# Patient Record
Sex: FEMALE | Race: WHITE | Employment: UNEMPLOYED | URBAN - METROPOLITAN AREA
[De-identification: names, ages, dates, MRNs, and addresses within clinical notes are randomized per-mention and may not be internally consistent; named-entity substitution may affect disease eponyms.]

---

## 2024-08-22 ENCOUNTER — APPOINTMENT (OUTPATIENT)
Dept: CARDIOLOGY | Facility: HOSPITAL | Age: 54
End: 2024-08-22
Payer: MEDICAID

## 2024-08-22 ENCOUNTER — HOSPITAL ENCOUNTER (EMERGENCY)
Facility: HOSPITAL | Age: 54
Discharge: OTHER INSTITUTION | End: 2024-08-23
Attending: EMERGENCY MEDICINE
Payer: MEDICAID

## 2024-08-22 DIAGNOSIS — K91.89 OTHER POSTOPERATIVE COMPLICATION INVOLVING DIGESTIVE SYSTEM: Primary | ICD-10-CM

## 2024-08-22 LAB
ALBUMIN SERPL BCP-MCNC: 3.3 G/DL (ref 3.4–5)
ALP SERPL-CCNC: 60 U/L (ref 33–110)
ALT SERPL W P-5'-P-CCNC: 77 U/L (ref 7–45)
ANION GAP SERPL CALC-SCNC: 12 MMOL/L (ref 10–20)
AST SERPL W P-5'-P-CCNC: 56 U/L (ref 9–39)
BASOPHILS # BLD MANUAL: 0 X10*3/UL (ref 0–0.1)
BASOPHILS NFR BLD MANUAL: 0 %
BILIRUB SERPL-MCNC: 0.3 MG/DL (ref 0–1.2)
BUN SERPL-MCNC: 15 MG/DL (ref 6–23)
CALCIUM SERPL-MCNC: 8.5 MG/DL (ref 8.6–10.3)
CHLORIDE SERPL-SCNC: 96 MMOL/L (ref 98–107)
CO2 SERPL-SCNC: 28 MMOL/L (ref 21–32)
CREAT SERPL-MCNC: 0.83 MG/DL (ref 0.5–1.05)
EGFRCR SERPLBLD CKD-EPI 2021: 84 ML/MIN/1.73M*2
EOSINOPHIL # BLD MANUAL: 0.19 X10*3/UL (ref 0–0.7)
EOSINOPHIL NFR BLD MANUAL: 1 %
ERYTHROCYTE [DISTWIDTH] IN BLOOD BY AUTOMATED COUNT: 14.7 % (ref 11.5–14.5)
GLUCOSE SERPL-MCNC: 110 MG/DL (ref 74–99)
HCT VFR BLD AUTO: 23 % (ref 36–46)
HGB BLD-MCNC: 7.8 G/DL (ref 12–16)
HYPOCHROMIA BLD QL SMEAR: ABNORMAL
IMM GRANULOCYTES # BLD AUTO: 1.01 X10*3/UL (ref 0–0.7)
IMM GRANULOCYTES NFR BLD AUTO: 5.3 % (ref 0–0.9)
LYMPHOCYTES # BLD MANUAL: 1.52 X10*3/UL (ref 1.2–4.8)
LYMPHOCYTES NFR BLD MANUAL: 8 %
MCH RBC QN AUTO: 30 PG (ref 26–34)
MCHC RBC AUTO-ENTMCNC: 33.9 G/DL (ref 32–36)
MCV RBC AUTO: 89 FL (ref 80–100)
MONOCYTES # BLD MANUAL: 0.19 X10*3/UL (ref 0.1–1)
MONOCYTES NFR BLD MANUAL: 1 %
NEUTROPHILS # BLD MANUAL: 17.1 X10*3/UL (ref 1.2–7.7)
NEUTS BAND # BLD MANUAL: 1.52 X10*3/UL (ref 0–0.7)
NEUTS BAND NFR BLD MANUAL: 8 %
NEUTS SEG # BLD MANUAL: 15.58 X10*3/UL (ref 1.2–7)
NEUTS SEG NFR BLD MANUAL: 82 %
NRBC BLD-RTO: 2.3 /100 WBCS (ref 0–0)
PLATELET # BLD AUTO: 523 X10*3/UL (ref 150–450)
POLYCHROMASIA BLD QL SMEAR: ABNORMAL
POTASSIUM SERPL-SCNC: 3.2 MMOL/L (ref 3.5–5.3)
PROT SERPL-MCNC: 6.4 G/DL (ref 6.4–8.2)
RBC # BLD AUTO: 2.6 X10*6/UL (ref 4–5.2)
RBC MORPH BLD: ABNORMAL
SODIUM SERPL-SCNC: 133 MMOL/L (ref 136–145)
TOTAL CELLS COUNTED BLD: 100
WBC # BLD AUTO: 19 X10*3/UL (ref 4.4–11.3)

## 2024-08-22 PROCEDURE — 96375 TX/PRO/DX INJ NEW DRUG ADDON: CPT

## 2024-08-22 PROCEDURE — 85007 BL SMEAR W/DIFF WBC COUNT: CPT | Performed by: EMERGENCY MEDICINE

## 2024-08-22 PROCEDURE — 93005 ELECTROCARDIOGRAM TRACING: CPT

## 2024-08-22 PROCEDURE — 99285 EMERGENCY DEPT VISIT HI MDM: CPT | Mod: 25

## 2024-08-22 PROCEDURE — 80053 COMPREHEN METABOLIC PANEL: CPT | Performed by: EMERGENCY MEDICINE

## 2024-08-22 PROCEDURE — 36415 COLL VENOUS BLD VENIPUNCTURE: CPT | Performed by: EMERGENCY MEDICINE

## 2024-08-22 PROCEDURE — 85027 COMPLETE CBC AUTOMATED: CPT | Performed by: EMERGENCY MEDICINE

## 2024-08-22 PROCEDURE — 96361 HYDRATE IV INFUSION ADD-ON: CPT

## 2024-08-22 PROCEDURE — 2500000004 HC RX 250 GENERAL PHARMACY W/ HCPCS (ALT 636 FOR OP/ED): Performed by: EMERGENCY MEDICINE

## 2024-08-22 RX ORDER — MORPHINE SULFATE 4 MG/ML
4 INJECTION INTRAVENOUS ONCE
Status: COMPLETED | OUTPATIENT
Start: 2024-08-22 | End: 2024-08-22

## 2024-08-22 RX ORDER — ONDANSETRON HYDROCHLORIDE 2 MG/ML
4 INJECTION, SOLUTION INTRAVENOUS ONCE
Status: COMPLETED | OUTPATIENT
Start: 2024-08-22 | End: 2024-08-22

## 2024-08-22 RX ADMIN — ONDANSETRON 4 MG: 2 INJECTION INTRAMUSCULAR; INTRAVENOUS at 23:13

## 2024-08-22 RX ADMIN — MORPHINE SULFATE 4 MG: 4 INJECTION INTRAVENOUS at 23:12

## 2024-08-22 RX ADMIN — SODIUM CHLORIDE 1000 ML: 9 INJECTION, SOLUTION INTRAVENOUS at 23:12

## 2024-08-22 ASSESSMENT — PAIN DESCRIPTION - LOCATION
LOCATION: ABDOMEN
LOCATION: ABDOMEN

## 2024-08-22 ASSESSMENT — PAIN - FUNCTIONAL ASSESSMENT: PAIN_FUNCTIONAL_ASSESSMENT: 0-10

## 2024-08-22 ASSESSMENT — LIFESTYLE VARIABLES
HAVE PEOPLE ANNOYED YOU BY CRITICIZING YOUR DRINKING: NO
EVER HAD A DRINK FIRST THING IN THE MORNING TO STEADY YOUR NERVES TO GET RID OF A HANGOVER: NO
HAVE YOU EVER FELT YOU SHOULD CUT DOWN ON YOUR DRINKING: NO
TOTAL SCORE: 0
EVER FELT BAD OR GUILTY ABOUT YOUR DRINKING: NO

## 2024-08-22 ASSESSMENT — PAIN SCALES - GENERAL
PAINLEVEL_OUTOF10: 6
PAINLEVEL_OUTOF10: 7

## 2024-08-22 ASSESSMENT — COLUMBIA-SUICIDE SEVERITY RATING SCALE - C-SSRS
1. IN THE PAST MONTH, HAVE YOU WISHED YOU WERE DEAD OR WISHED YOU COULD GO TO SLEEP AND NOT WAKE UP?: NO
6. HAVE YOU EVER DONE ANYTHING, STARTED TO DO ANYTHING, OR PREPARED TO DO ANYTHING TO END YOUR LIFE?: NO
2. HAVE YOU ACTUALLY HAD ANY THOUGHTS OF KILLING YOURSELF?: NO

## 2024-08-22 ASSESSMENT — PAIN DESCRIPTION - PAIN TYPE: TYPE: ACUTE PAIN

## 2024-08-23 ENCOUNTER — APPOINTMENT (OUTPATIENT)
Dept: RADIOLOGY | Facility: HOSPITAL | Age: 54
End: 2024-08-23
Payer: MEDICAID

## 2024-08-23 VITALS
TEMPERATURE: 98.3 F | WEIGHT: 195.11 LBS | DIASTOLIC BLOOD PRESSURE: 84 MMHG | RESPIRATION RATE: 9 BRPM | HEART RATE: 79 BPM | BODY MASS INDEX: 30.62 KG/M2 | SYSTOLIC BLOOD PRESSURE: 133 MMHG | OXYGEN SATURATION: 100 % | HEIGHT: 67 IN

## 2024-08-23 LAB
APPEARANCE UR: CLEAR
ATRIAL RATE: 97 BPM
BACTERIA #/AREA URNS AUTO: ABNORMAL /HPF
BILIRUB UR STRIP.AUTO-MCNC: NEGATIVE MG/DL
COLOR UR: ABNORMAL
GLUCOSE UR STRIP.AUTO-MCNC: NORMAL MG/DL
HOLD SPECIMEN: NORMAL
KETONES UR STRIP.AUTO-MCNC: NEGATIVE MG/DL
LACTATE SERPL-SCNC: 0.6 MMOL/L (ref 0.4–2)
LEUKOCYTE ESTERASE UR QL STRIP.AUTO: ABNORMAL
NITRITE UR QL STRIP.AUTO: NEGATIVE
P AXIS: 56 DEGREES
PH UR STRIP.AUTO: 6 [PH]
PR INTERVAL: 128 MS
PROT UR STRIP.AUTO-MCNC: NEGATIVE MG/DL
Q ONSET: 249 MS
QRS COUNT: 16 BEATS
QRS DURATION: 96 MS
QT INTERVAL: 376 MS
QTC CALCULATION(BAZETT): 478 MS
QTC FREDERICIA: 441 MS
R AXIS: 36 DEGREES
RBC # UR STRIP.AUTO: NEGATIVE /UL
RBC #/AREA URNS AUTO: ABNORMAL /HPF
SP GR UR STRIP.AUTO: 1.03
SQUAMOUS #/AREA URNS AUTO: ABNORMAL /HPF
T AXIS: 59 DEGREES
T OFFSET: 437 MS
UROBILINOGEN UR STRIP.AUTO-MCNC: NORMAL MG/DL
VENTRICULAR RATE: 97 BPM
WBC #/AREA URNS AUTO: ABNORMAL /HPF

## 2024-08-23 PROCEDURE — 87040 BLOOD CULTURE FOR BACTERIA: CPT | Mod: PORLAB | Performed by: EMERGENCY MEDICINE

## 2024-08-23 PROCEDURE — 74177 CT ABD & PELVIS W/CONTRAST: CPT | Performed by: STUDENT IN AN ORGANIZED HEALTH CARE EDUCATION/TRAINING PROGRAM

## 2024-08-23 PROCEDURE — 96365 THER/PROPH/DIAG IV INF INIT: CPT

## 2024-08-23 PROCEDURE — 2500000001 HC RX 250 WO HCPCS SELF ADMINISTERED DRUGS (ALT 637 FOR MEDICARE OP): Performed by: STUDENT IN AN ORGANIZED HEALTH CARE EDUCATION/TRAINING PROGRAM

## 2024-08-23 PROCEDURE — 81001 URINALYSIS AUTO W/SCOPE: CPT | Performed by: EMERGENCY MEDICINE

## 2024-08-23 PROCEDURE — 83605 ASSAY OF LACTIC ACID: CPT | Performed by: EMERGENCY MEDICINE

## 2024-08-23 PROCEDURE — 2500000004 HC RX 250 GENERAL PHARMACY W/ HCPCS (ALT 636 FOR OP/ED): Mod: JZ | Performed by: STUDENT IN AN ORGANIZED HEALTH CARE EDUCATION/TRAINING PROGRAM

## 2024-08-23 PROCEDURE — 96366 THER/PROPH/DIAG IV INF ADDON: CPT

## 2024-08-23 PROCEDURE — 36415 COLL VENOUS BLD VENIPUNCTURE: CPT | Performed by: EMERGENCY MEDICINE

## 2024-08-23 PROCEDURE — 2500000004 HC RX 250 GENERAL PHARMACY W/ HCPCS (ALT 636 FOR OP/ED): Performed by: EMERGENCY MEDICINE

## 2024-08-23 PROCEDURE — 2500000004 HC RX 250 GENERAL PHARMACY W/ HCPCS (ALT 636 FOR OP/ED)

## 2024-08-23 PROCEDURE — 96375 TX/PRO/DX INJ NEW DRUG ADDON: CPT

## 2024-08-23 PROCEDURE — 2500000004 HC RX 250 GENERAL PHARMACY W/ HCPCS (ALT 636 FOR OP/ED): Performed by: STUDENT IN AN ORGANIZED HEALTH CARE EDUCATION/TRAINING PROGRAM

## 2024-08-23 PROCEDURE — 74177 CT ABD & PELVIS W/CONTRAST: CPT

## 2024-08-23 PROCEDURE — 2550000001 HC RX 255 CONTRASTS: Performed by: EMERGENCY MEDICINE

## 2024-08-23 PROCEDURE — 87086 URINE CULTURE/COLONY COUNT: CPT | Mod: PORLAB | Performed by: EMERGENCY MEDICINE

## 2024-08-23 PROCEDURE — 96367 TX/PROPH/DG ADDL SEQ IV INF: CPT

## 2024-08-23 PROCEDURE — 96376 TX/PRO/DX INJ SAME DRUG ADON: CPT

## 2024-08-23 RX ORDER — METRONIDAZOLE 500 MG/100ML
500 INJECTION, SOLUTION INTRAVENOUS EVERY 8 HOURS
Status: DISCONTINUED | OUTPATIENT
Start: 2024-08-23 | End: 2024-08-24 | Stop reason: HOSPADM

## 2024-08-23 RX ORDER — ONDANSETRON HYDROCHLORIDE 2 MG/ML
INJECTION, SOLUTION INTRAVENOUS
Status: COMPLETED
Start: 2024-08-23 | End: 2024-08-23

## 2024-08-23 RX ORDER — OXYCODONE HYDROCHLORIDE 5 MG/1
5 TABLET ORAL EVERY 6 HOURS PRN
Status: DISCONTINUED | OUTPATIENT
Start: 2024-08-23 | End: 2024-08-24 | Stop reason: HOSPADM

## 2024-08-23 RX ORDER — FENTANYL CITRATE 50 UG/ML
50 INJECTION, SOLUTION INTRAMUSCULAR; INTRAVENOUS ONCE
Status: COMPLETED | OUTPATIENT
Start: 2024-08-23 | End: 2024-08-23

## 2024-08-23 RX ORDER — ACETAMINOPHEN 325 MG/1
975 TABLET ORAL EVERY 8 HOURS
Status: DISCONTINUED | OUTPATIENT
Start: 2024-08-23 | End: 2024-08-24 | Stop reason: HOSPADM

## 2024-08-23 RX ORDER — ONDANSETRON HYDROCHLORIDE 2 MG/ML
4 INJECTION, SOLUTION INTRAVENOUS ONCE
Status: COMPLETED | OUTPATIENT
Start: 2024-08-23 | End: 2024-08-23

## 2024-08-23 RX ORDER — CEFEPIME 1 G/50ML
2 INJECTION, SOLUTION INTRAVENOUS ONCE
Status: COMPLETED | OUTPATIENT
Start: 2024-08-23 | End: 2024-08-23

## 2024-08-23 RX ORDER — MORPHINE SULFATE 4 MG/ML
INJECTION INTRAVENOUS
Status: DISCONTINUED
Start: 2024-08-23 | End: 2024-08-23 | Stop reason: WASHOUT

## 2024-08-23 RX ORDER — CEFEPIME 1 G/50ML
1 INJECTION, SOLUTION INTRAVENOUS EVERY 12 HOURS
Status: DISCONTINUED | OUTPATIENT
Start: 2024-08-23 | End: 2024-08-24 | Stop reason: HOSPADM

## 2024-08-23 RX ORDER — METRONIDAZOLE 500 MG/100ML
500 INJECTION, SOLUTION INTRAVENOUS ONCE
Status: COMPLETED | OUTPATIENT
Start: 2024-08-23 | End: 2024-08-23

## 2024-08-23 RX ORDER — MORPHINE SULFATE 4 MG/ML
4 INJECTION INTRAVENOUS ONCE
Status: COMPLETED | OUTPATIENT
Start: 2024-08-23 | End: 2024-08-23

## 2024-08-23 RX ORDER — DICYCLOMINE HYDROCHLORIDE 10 MG/1
10 CAPSULE ORAL ONCE
Status: COMPLETED | OUTPATIENT
Start: 2024-08-23 | End: 2024-08-23

## 2024-08-23 RX ORDER — KETOROLAC TROMETHAMINE 30 MG/ML
15 INJECTION, SOLUTION INTRAMUSCULAR; INTRAVENOUS EVERY 6 HOURS PRN
Status: DISCONTINUED | OUTPATIENT
Start: 2024-08-23 | End: 2024-08-24 | Stop reason: HOSPADM

## 2024-08-23 RX ADMIN — MORPHINE SULFATE 4 MG: 4 INJECTION INTRAVENOUS at 01:47

## 2024-08-23 RX ADMIN — FENTANYL CITRATE 50 MCG: 50 INJECTION INTRAMUSCULAR; INTRAVENOUS at 20:20

## 2024-08-23 RX ADMIN — ACETAMINOPHEN 975 MG: 325 TABLET ORAL at 12:05

## 2024-08-23 RX ADMIN — ONDANSETRON 4 MG: 2 INJECTION INTRAMUSCULAR; INTRAVENOUS at 07:49

## 2024-08-23 RX ADMIN — CEFEPIME 1 G: 1 INJECTION, SOLUTION INTRAVENOUS at 14:41

## 2024-08-23 RX ADMIN — METRONIDAZOLE 500 MG: 500 INJECTION, SOLUTION INTRAVENOUS at 20:25

## 2024-08-23 RX ADMIN — FENTANYL CITRATE 50 MCG: 50 INJECTION INTRAMUSCULAR; INTRAVENOUS at 22:52

## 2024-08-23 RX ADMIN — METRONIDAZOLE 500 MG: 500 INJECTION, SOLUTION INTRAVENOUS at 02:24

## 2024-08-23 RX ADMIN — CEFEPIME 2 G: 1 INJECTION, SOLUTION INTRAVENOUS at 01:14

## 2024-08-23 RX ADMIN — ONDANSETRON 4 MG: 2 INJECTION INTRAMUSCULAR; INTRAVENOUS at 13:49

## 2024-08-23 RX ADMIN — DICYCLOMINE HYDROCHLORIDE 10 MG: 10 CAPSULE ORAL at 20:21

## 2024-08-23 RX ADMIN — ONDANSETRON HYDROCHLORIDE 4 MG: 2 INJECTION, SOLUTION INTRAVENOUS at 18:52

## 2024-08-23 RX ADMIN — ONDANSETRON 4 MG: 2 INJECTION INTRAMUSCULAR; INTRAVENOUS at 18:52

## 2024-08-23 RX ADMIN — SODIUM CHLORIDE 2655 ML: 9 INJECTION, SOLUTION INTRAVENOUS at 01:13

## 2024-08-23 RX ADMIN — IOHEXOL 75 ML: 350 INJECTION, SOLUTION INTRAVENOUS at 00:48

## 2024-08-23 RX ADMIN — METRONIDAZOLE 500 MG: 500 INJECTION, SOLUTION INTRAVENOUS at 11:41

## 2024-08-23 RX ADMIN — OXYCODONE HYDROCHLORIDE 5 MG: 5 TABLET ORAL at 11:50

## 2024-08-23 RX ADMIN — KETOROLAC TROMETHAMINE 15 MG: 30 INJECTION, SOLUTION INTRAMUSCULAR at 12:06

## 2024-08-23 RX ADMIN — ACETAMINOPHEN 975 MG: 325 TABLET ORAL at 22:54

## 2024-08-23 ASSESSMENT — PAIN DESCRIPTION - PAIN TYPE: TYPE: ACUTE PAIN

## 2024-08-23 ASSESSMENT — PAIN SCALES - GENERAL
PAINLEVEL_OUTOF10: 3
PAINLEVEL_OUTOF10: 8
PAINLEVEL_OUTOF10: 5 - MODERATE PAIN
PAINLEVEL_OUTOF10: 7
PAINLEVEL_OUTOF10: 7
PAINLEVEL_OUTOF10: 6
PAINLEVEL_OUTOF10: 8
PAINLEVEL_OUTOF10: 8
PAINLEVEL_OUTOF10: 7
PAINLEVEL_OUTOF10: 8
PAINLEVEL_OUTOF10: 7
PAINLEVEL_OUTOF10: 8

## 2024-08-23 ASSESSMENT — PAIN SCALES - WONG BAKER
WONGBAKER_NUMERICALRESPONSE: HURTS LITTLE MORE
WONGBAKER_NUMERICALRESPONSE: HURTS LITTLE MORE

## 2024-08-23 ASSESSMENT — PAIN DESCRIPTION - LOCATION
LOCATION: ABDOMEN
LOCATION: ABDOMEN

## 2024-08-23 ASSESSMENT — PAIN - FUNCTIONAL ASSESSMENT: PAIN_FUNCTIONAL_ASSESSMENT: 0-10

## 2024-08-23 ASSESSMENT — PAIN DESCRIPTION - PROGRESSION: CLINICAL_PROGRESSION: NOT CHANGED

## 2024-08-23 NOTE — ED PROVIDER NOTES
HPI   Chief Complaint   Patient presents with    Abdominal Pain       Chief complaint: Abdominal pain    History of present illness: Patient is a 64-year-old female presenting to the emergency department with complaints of abdominal pain.  According to the patient, she had a history of a perforated bowel which required an ostomy.  The patient states that earlier this week, she had reversal of her ostomy.  This was performed at [facility].  The patient states that initially she felt improved however, since the surgery she has had gradual worsening of her abdominal discomfort.  The patient states that she has had no fever.  Patient states that she has been feeling somewhat unwell.  Today, the pain was getting worse.  The patient states that the pain is so bad that her  was unable to drive her to [place] secondary to her abdominal pain.  As result, the patient presents to the emergency department for further evaluation.  She has no other complaints at this time.  She denies any dysuria or hematuria.      History provided by:  Patient and relative   used: No            Patient History   History reviewed. No pertinent past medical history.  History reviewed. No pertinent surgical history.  No family history on file.  Social History     Tobacco Use    Smoking status: Never    Smokeless tobacco: Never   Substance Use Topics    Alcohol use: Not Currently    Drug use: Yes     Types: Marijuana       Physical Exam   ED Triage Vitals [08/22/24 2157]   Temperature Heart Rate Respirations BP   36.8 °C (98.2 °F) 93 18 (!) 133/94      Pulse Ox Temp src Heart Rate Source Patient Position   95 % -- -- --      BP Location FiO2 (%)     -- --       Physical Exam  Constitutional:       Appearance: Normal appearance.   HENT:      Head: Normocephalic and atraumatic.      Right Ear: External ear normal.      Left Ear: External ear normal.      Nose: Nose normal.      Mouth/Throat:      Mouth:  Mucous membranes are moist.   Eyes:      General: Lids are normal.      Extraocular Movements: Extraocular movements intact.      Pupils: Pupils are equal, round, and reactive to light.   Cardiovascular:      Rate and Rhythm: Normal rate and regular rhythm.      Heart sounds: Normal heart sounds.   Pulmonary:      Effort: Pulmonary effort is normal.      Breath sounds: Normal breath sounds.   Abdominal:      General: Abdomen is flat.      Palpations: Abdomen is soft.      Tenderness: There is abdominal tenderness. There is guarding and rebound.   Musculoskeletal:         General: No deformity. Normal range of motion.      Cervical back: Normal range of motion and neck supple.   Skin:     General: Skin is warm.      Capillary Refill: Capillary refill takes less than 2 seconds.      Coloration: Skin is not jaundiced.   Neurological:      General: No focal deficit present.      Mental Status: She is alert and oriented to person, place, and time.   Psychiatric:         Mood and Affect: Mood normal.         Behavior: Behavior normal.           ED Course & MDM   Diagnoses as of 08/28/24 2139   Other postoperative complication involving digestive system                 No data recorded     [place] Coma Scale Score: 15 (08/22/24 2158 : Shawanda Kc RN)                           Medical Decision Making  Medical decision making: On arrival to the emergency department, I had significant concern as the patient had peritoneal signs occluding mild guarding as well as significant rebound tenderness as result started the patient on broad-spectrum antibiotics patient was given pain control including morphine Zofran and IV hydration during her time in the emergency department.    CBC demonstrated white cell count of 19 with presence of bands and segs.  Patient's Chem-7 and LFTs were essentially within normal limits lactate was negative urinalysis demonstrated 250 leuks and 11-20 white cells.  CAT scan the patient's abdomen pelvis  with IV contrast demonstrated postoperative changes and mesenteric stranding and small gas in the abdominal cavity as well as a dissection of the superior mesenteric artery.  The patient's EKG demonstrated a normal sinus rhythm with a rate of 97 bpm isoelectric ST segments narrow QRS complexes and a QTc of 478.    Patient presents to the emergency department with complaints of abdominal pain after surgery.  Workup was performed as above I was concerned given the patient's mesenteric fat stranding as well as elevated white blood cell count presence of bands as well as rebound tenderness.  Patient was given broad-spectrum antibiotics including cefepime and metronidazole.  Patient was given additional pain medication in the emergency department.    I spoke with patient's surgeon at [facility] he requested the patient be admitted to medicine with consult to him at [facility].  I spoke with hospitalist at [facility] they agree patient can be admitted to their service with consult to surgery.  The patient was observed in the emergency department until a bed became available at [place].  The patient was then transferred by ambulance in otherwise stable condition.    Amount and/or Complexity of Data Reviewed  Labs: ordered. Decision-making details documented in ED Course.  Radiology: ordered. Decision-making details documented in ED Course.  ECG/medicine tests: ordered and independent interpretation performed. Decision-making details documented in ED Course.        Procedure  Procedures     Douglas Ramirez MD  08/28/24 4749

## 2024-08-23 NOTE — ED TRIAGE NOTES
Pt. Came in via squad for abdominal pain, dizziness, weakness, and black tarry stool after abdominal surgery almost 2 weeks ago. Pt. Rates pain 6/10 in abdominal area. Slight bruising on abdomen withbrown crust around umbilicus

## 2024-08-23 NOTE — PROGRESS NOTES
Emergency Medicine Transition of Care Note.    I received Olga Art in signout from Dr. Ramírez.  Please see the previous ED provider note for all HPI, PE and MDM up to the time of signout. This is in addition to the primary record.    In brief Olga Art is an 54 y.o. female presenting for abdominal pain, melena, and generalized weakness. Patient with recent history of sigmoid resection and anastomosis. CT abdomen showing postoperative with mesenteric inflammatory change in pelvis and edema in left paracolic gutter, unable to exclude developing infection. CT also showing age indeterminate superior mesenteric artery focal dissection. Patient with leukocytosis of 19. Given cefepime and flagyl along with analgesia. Accepting facility documented and transport pending.    Chief Complaint   Patient presents with    Abdominal Pain        Diagnoses as of 08/23/24 1750   Other postoperative complication involving digestive system       Medical Decision Making  Patient accepted pending transport. Remains medically stabilized.    Final diagnoses:   [K91.89] Other postoperative complication involving digestive system       Annemarie Hall MD

## 2024-08-24 LAB — BACTERIA UR CULT: ABNORMAL

## 2024-08-25 LAB
BACTERIA BLD CULT: NORMAL
BACTERIA BLD CULT: NORMAL

## 2024-08-26 LAB — BACTERIA UR CULT: ABNORMAL

## 2024-08-27 LAB
BACTERIA BLD CULT: NORMAL
BACTERIA BLD CULT: NORMAL

## 2024-08-31 ENCOUNTER — APPOINTMENT (OUTPATIENT)
Dept: RADIOLOGY | Facility: HOSPITAL | Age: 54
End: 2024-08-31
Payer: MEDICAID

## 2024-08-31 ENCOUNTER — APPOINTMENT (OUTPATIENT)
Dept: CARDIOLOGY | Facility: HOSPITAL | Age: 54
End: 2024-08-31
Payer: MEDICAID

## 2024-08-31 ENCOUNTER — HOSPITAL ENCOUNTER (INPATIENT)
Facility: HOSPITAL | Age: 54
End: 2024-08-31
Attending: EMERGENCY MEDICINE | Admitting: INTERNAL MEDICINE
Payer: MEDICAID

## 2024-08-31 DIAGNOSIS — R10.13 EPIGASTRIC PAIN: ICD-10-CM

## 2024-08-31 DIAGNOSIS — F31.9 BIPOLAR 1 DISORDER (MULTI): ICD-10-CM

## 2024-08-31 DIAGNOSIS — F32.A DEPRESSIVE DISORDER: ICD-10-CM

## 2024-08-31 DIAGNOSIS — K59.00 CONSTIPATION, UNSPECIFIED CONSTIPATION TYPE: ICD-10-CM

## 2024-08-31 DIAGNOSIS — R11.0 NAUSEA: ICD-10-CM

## 2024-08-31 DIAGNOSIS — K56.609: ICD-10-CM

## 2024-08-31 DIAGNOSIS — I26.99 PULMONARY EMBOLISM WITHOUT ACUTE COR PULMONALE, UNSPECIFIED CHRONICITY, UNSPECIFIED PULMONARY EMBOLISM TYPE (MULTI): Primary | ICD-10-CM

## 2024-08-31 DIAGNOSIS — M79.89 OTHER SPECIFIED SOFT TISSUE DISORDERS: ICD-10-CM

## 2024-08-31 PROBLEM — Z98.890 HISTORY OF COLOSTOMY REVERSAL: Status: ACTIVE | Noted: 2024-08-13

## 2024-08-31 PROBLEM — K56.601 COMPLETE INTESTINAL OBSTRUCTION (MULTI): Status: ACTIVE | Noted: 2024-06-03

## 2024-08-31 PROBLEM — K27.6: Status: ACTIVE | Noted: 2024-08-13

## 2024-08-31 LAB
ALBUMIN SERPL BCP-MCNC: 3.9 G/DL (ref 3.4–5)
ALP SERPL-CCNC: 57 U/L (ref 33–110)
ALT SERPL W P-5'-P-CCNC: 14 U/L (ref 7–45)
ANION GAP SERPL CALC-SCNC: 13 MMOL/L (ref 10–20)
APPEARANCE UR: CLEAR
APTT PPP: 29 SECONDS (ref 27–38)
AST SERPL W P-5'-P-CCNC: 17 U/L (ref 9–39)
BASOPHILS # BLD AUTO: 0.02 X10*3/UL (ref 0–0.1)
BASOPHILS NFR BLD AUTO: 0.3 %
BILIRUB SERPL-MCNC: 0.4 MG/DL (ref 0–1.2)
BILIRUB UR STRIP.AUTO-MCNC: NEGATIVE MG/DL
BNP SERPL-MCNC: 30 PG/ML (ref 0–99)
BUN SERPL-MCNC: 10 MG/DL (ref 6–23)
CALCIUM SERPL-MCNC: 8.8 MG/DL (ref 8.6–10.3)
CARDIAC TROPONIN I PNL SERPL HS: 6 NG/L (ref 0–13)
CHLORIDE SERPL-SCNC: 101 MMOL/L (ref 98–107)
CO2 SERPL-SCNC: 26 MMOL/L (ref 21–32)
COLOR UR: ABNORMAL
CREAT SERPL-MCNC: 0.72 MG/DL (ref 0.5–1.05)
EGFRCR SERPLBLD CKD-EPI 2021: >90 ML/MIN/1.73M*2
EOSINOPHIL # BLD AUTO: 0.01 X10*3/UL (ref 0–0.7)
EOSINOPHIL NFR BLD AUTO: 0.1 %
ERYTHROCYTE [DISTWIDTH] IN BLOOD BY AUTOMATED COUNT: 15.9 % (ref 11.5–14.5)
GLUCOSE SERPL-MCNC: 125 MG/DL (ref 74–99)
GLUCOSE UR STRIP.AUTO-MCNC: NORMAL MG/DL
HCT VFR BLD AUTO: 32.3 % (ref 36–46)
HGB BLD-MCNC: 10.2 G/DL (ref 12–16)
IMM GRANULOCYTES # BLD AUTO: 0.02 X10*3/UL (ref 0–0.7)
IMM GRANULOCYTES NFR BLD AUTO: 0.3 % (ref 0–0.9)
INR PPP: 1.1 (ref 0.9–1.1)
KETONES UR STRIP.AUTO-MCNC: ABNORMAL MG/DL
LACTATE SERPL-SCNC: 1 MMOL/L (ref 0.4–2)
LEUKOCYTE ESTERASE UR QL STRIP.AUTO: ABNORMAL
LIPASE SERPL-CCNC: <3 U/L (ref 9–82)
LYMPHOCYTES # BLD AUTO: 1.33 X10*3/UL (ref 1.2–4.8)
LYMPHOCYTES NFR BLD AUTO: 17.9 %
MCH RBC QN AUTO: 28.6 PG (ref 26–34)
MCHC RBC AUTO-ENTMCNC: 31.6 G/DL (ref 32–36)
MCV RBC AUTO: 91 FL (ref 80–100)
MONOCYTES # BLD AUTO: 0.61 X10*3/UL (ref 0.1–1)
MONOCYTES NFR BLD AUTO: 8.2 %
NEUTROPHILS # BLD AUTO: 5.45 X10*3/UL (ref 1.2–7.7)
NEUTROPHILS NFR BLD AUTO: 73.2 %
NITRITE UR QL STRIP.AUTO: NEGATIVE
NRBC BLD-RTO: 0 /100 WBCS (ref 0–0)
PH UR STRIP.AUTO: 7 [PH]
PLATELET # BLD AUTO: 709 X10*3/UL (ref 150–450)
POTASSIUM SERPL-SCNC: 4.1 MMOL/L (ref 3.5–5.3)
PROT SERPL-MCNC: 7.4 G/DL (ref 6.4–8.2)
PROT UR STRIP.AUTO-MCNC: NEGATIVE MG/DL
PROTHROMBIN TIME: 12.9 SECONDS (ref 9.8–12.8)
RBC # BLD AUTO: 3.57 X10*6/UL (ref 4–5.2)
RBC # UR STRIP.AUTO: NEGATIVE /UL
RBC #/AREA URNS AUTO: NORMAL /HPF
SODIUM SERPL-SCNC: 136 MMOL/L (ref 136–145)
SP GR UR STRIP.AUTO: 1.04
SQUAMOUS #/AREA URNS AUTO: NORMAL /HPF
UFH PPP CHRO-ACNC: 0.7 IU/ML
UFH PPP CHRO-ACNC: 0.9 IU/ML
UROBILINOGEN UR STRIP.AUTO-MCNC: NORMAL MG/DL
WBC # BLD AUTO: 7.4 X10*3/UL (ref 4.4–11.3)
WBC #/AREA URNS AUTO: NORMAL /HPF

## 2024-08-31 PROCEDURE — 2500000001 HC RX 250 WO HCPCS SELF ADMINISTERED DRUGS (ALT 637 FOR MEDICARE OP)

## 2024-08-31 PROCEDURE — 74177 CT ABD & PELVIS W/CONTRAST: CPT

## 2024-08-31 PROCEDURE — 96375 TX/PRO/DX INJ NEW DRUG ADDON: CPT

## 2024-08-31 PROCEDURE — 2550000001 HC RX 255 CONTRASTS: Performed by: EMERGENCY MEDICINE

## 2024-08-31 PROCEDURE — 99285 EMERGENCY DEPT VISIT HI MDM: CPT

## 2024-08-31 PROCEDURE — 99223 1ST HOSP IP/OBS HIGH 75: CPT

## 2024-08-31 PROCEDURE — 96365 THER/PROPH/DIAG IV INF INIT: CPT

## 2024-08-31 PROCEDURE — 83605 ASSAY OF LACTIC ACID: CPT | Performed by: EMERGENCY MEDICINE

## 2024-08-31 PROCEDURE — 84484 ASSAY OF TROPONIN QUANT: CPT | Performed by: EMERGENCY MEDICINE

## 2024-08-31 PROCEDURE — 83880 ASSAY OF NATRIURETIC PEPTIDE: CPT | Performed by: EMERGENCY MEDICINE

## 2024-08-31 PROCEDURE — 71275 CT ANGIOGRAPHY CHEST: CPT

## 2024-08-31 PROCEDURE — 87086 URINE CULTURE/COLONY COUNT: CPT | Mod: PORLAB | Performed by: EMERGENCY MEDICINE

## 2024-08-31 PROCEDURE — 71045 X-RAY EXAM CHEST 1 VIEW: CPT | Performed by: RADIOLOGY

## 2024-08-31 PROCEDURE — 85610 PROTHROMBIN TIME: CPT | Performed by: EMERGENCY MEDICINE

## 2024-08-31 PROCEDURE — 85520 HEPARIN ASSAY: CPT | Performed by: INTERNAL MEDICINE

## 2024-08-31 PROCEDURE — 2500000004 HC RX 250 GENERAL PHARMACY W/ HCPCS (ALT 636 FOR OP/ED): Mod: JZ | Performed by: EMERGENCY MEDICINE

## 2024-08-31 PROCEDURE — 36415 COLL VENOUS BLD VENIPUNCTURE: CPT | Performed by: EMERGENCY MEDICINE

## 2024-08-31 PROCEDURE — 1200000002 HC GENERAL ROOM WITH TELEMETRY DAILY

## 2024-08-31 PROCEDURE — 83690 ASSAY OF LIPASE: CPT | Performed by: EMERGENCY MEDICINE

## 2024-08-31 PROCEDURE — 71045 X-RAY EXAM CHEST 1 VIEW: CPT

## 2024-08-31 PROCEDURE — 96367 TX/PROPH/DG ADDL SEQ IV INF: CPT

## 2024-08-31 PROCEDURE — 87040 BLOOD CULTURE FOR BACTERIA: CPT | Mod: PORLAB | Performed by: EMERGENCY MEDICINE

## 2024-08-31 PROCEDURE — 81001 URINALYSIS AUTO W/SCOPE: CPT | Performed by: EMERGENCY MEDICINE

## 2024-08-31 PROCEDURE — 93005 ELECTROCARDIOGRAM TRACING: CPT

## 2024-08-31 PROCEDURE — 85520 HEPARIN ASSAY: CPT | Performed by: EMERGENCY MEDICINE

## 2024-08-31 PROCEDURE — 2500000004 HC RX 250 GENERAL PHARMACY W/ HCPCS (ALT 636 FOR OP/ED): Performed by: INTERNAL MEDICINE

## 2024-08-31 PROCEDURE — 74177 CT ABD & PELVIS W/CONTRAST: CPT | Performed by: RADIOLOGY

## 2024-08-31 PROCEDURE — 85025 COMPLETE CBC W/AUTO DIFF WBC: CPT | Performed by: EMERGENCY MEDICINE

## 2024-08-31 PROCEDURE — 80053 COMPREHEN METABOLIC PANEL: CPT | Performed by: EMERGENCY MEDICINE

## 2024-08-31 PROCEDURE — 2500000004 HC RX 250 GENERAL PHARMACY W/ HCPCS (ALT 636 FOR OP/ED)

## 2024-08-31 RX ORDER — METRONIDAZOLE 500 MG/100ML
500 INJECTION, SOLUTION INTRAVENOUS ONCE
Status: COMPLETED | OUTPATIENT
Start: 2024-08-31 | End: 2024-08-31

## 2024-08-31 RX ORDER — MIRTAZAPINE 15 MG/1
1 TABLET, FILM COATED ORAL NIGHTLY
COMMUNITY

## 2024-08-31 RX ORDER — MORPHINE SULFATE 4 MG/ML
4 INJECTION INTRAVENOUS ONCE
Status: COMPLETED | OUTPATIENT
Start: 2024-08-31 | End: 2024-08-31

## 2024-08-31 RX ORDER — VENLAFAXINE HYDROCHLORIDE 150 MG/1
150 CAPSULE, EXTENDED RELEASE ORAL DAILY
Status: DISCONTINUED | OUTPATIENT
Start: 2024-08-31 | End: 2024-09-04 | Stop reason: HOSPADM

## 2024-08-31 RX ORDER — CEFTRIAXONE 1 G/50ML
1 INJECTION, SOLUTION INTRAVENOUS ONCE
Status: COMPLETED | OUTPATIENT
Start: 2024-08-31 | End: 2024-08-31

## 2024-08-31 RX ORDER — VENLAFAXINE HYDROCHLORIDE 150 MG/1
150 CAPSULE, EXTENDED RELEASE ORAL
Status: DISCONTINUED | OUTPATIENT
Start: 2024-09-01 | End: 2024-08-31

## 2024-08-31 RX ORDER — BISACODYL 5 MG
10 TABLET, DELAYED RELEASE (ENTERIC COATED) ORAL DAILY PRN
Status: DISCONTINUED | OUTPATIENT
Start: 2024-08-31 | End: 2024-09-04 | Stop reason: HOSPADM

## 2024-08-31 RX ORDER — PANTOPRAZOLE SODIUM 40 MG/1
40 TABLET, DELAYED RELEASE ORAL
Status: DISCONTINUED | OUTPATIENT
Start: 2024-09-01 | End: 2024-09-04 | Stop reason: HOSPADM

## 2024-08-31 RX ORDER — VENLAFAXINE HYDROCHLORIDE 37.5 MG/1
37.5 CAPSULE, EXTENDED RELEASE ORAL DAILY
COMMUNITY
Start: 2024-08-06

## 2024-08-31 RX ORDER — CEPHALEXIN 500 MG/1
500 CAPSULE ORAL 4 TIMES DAILY
Status: DISCONTINUED | OUTPATIENT
Start: 2024-08-31 | End: 2024-09-04 | Stop reason: HOSPADM

## 2024-08-31 RX ORDER — MIRTAZAPINE 15 MG/1
15 TABLET, FILM COATED ORAL NIGHTLY
Status: DISCONTINUED | OUTPATIENT
Start: 2024-08-31 | End: 2024-08-31

## 2024-08-31 RX ORDER — VENLAFAXINE HYDROCHLORIDE 150 MG/1
150 TABLET, EXTENDED RELEASE ORAL
COMMUNITY
Start: 2024-08-06

## 2024-08-31 RX ORDER — PANTOPRAZOLE SODIUM 40 MG/10ML
40 INJECTION, POWDER, LYOPHILIZED, FOR SOLUTION INTRAVENOUS
Status: DISCONTINUED | OUTPATIENT
Start: 2024-09-01 | End: 2024-09-04 | Stop reason: HOSPADM

## 2024-08-31 RX ORDER — MORPHINE SULFATE 2 MG/ML
2 INJECTION, SOLUTION INTRAMUSCULAR; INTRAVENOUS EVERY 6 HOURS PRN
Status: DISCONTINUED | OUTPATIENT
Start: 2024-08-31 | End: 2024-09-04 | Stop reason: HOSPADM

## 2024-08-31 RX ORDER — TALC
3 POWDER (GRAM) TOPICAL NIGHTLY PRN
Status: DISCONTINUED | OUTPATIENT
Start: 2024-08-31 | End: 2024-09-04 | Stop reason: HOSPADM

## 2024-08-31 RX ORDER — ONDANSETRON HYDROCHLORIDE 2 MG/ML
4 INJECTION, SOLUTION INTRAVENOUS ONCE
Status: COMPLETED | OUTPATIENT
Start: 2024-08-31 | End: 2024-08-31

## 2024-08-31 RX ORDER — ACETAMINOPHEN 325 MG/1
650 TABLET ORAL EVERY 4 HOURS PRN
Status: DISCONTINUED | OUTPATIENT
Start: 2024-08-31 | End: 2024-09-04 | Stop reason: HOSPADM

## 2024-08-31 RX ORDER — CEPHALEXIN 500 MG/1
500 CAPSULE ORAL 4 TIMES DAILY
COMMUNITY
Start: 2024-08-28 | End: 2024-09-07

## 2024-08-31 RX ORDER — ONDANSETRON HYDROCHLORIDE 2 MG/ML
4 INJECTION, SOLUTION INTRAVENOUS EVERY 6 HOURS PRN
Status: DISCONTINUED | OUTPATIENT
Start: 2024-08-31 | End: 2024-09-04 | Stop reason: HOSPADM

## 2024-08-31 RX ORDER — METOPROLOL TARTRATE 1 MG/ML
5 INJECTION, SOLUTION INTRAVENOUS EVERY 5 MIN PRN
Status: DISCONTINUED | OUTPATIENT
Start: 2024-08-31 | End: 2024-08-31

## 2024-08-31 RX ORDER — METRONIDAZOLE 500 MG/1
500 TABLET ORAL 3 TIMES DAILY
Status: DISCONTINUED | OUTPATIENT
Start: 2024-08-31 | End: 2024-09-04 | Stop reason: HOSPADM

## 2024-08-31 RX ORDER — POLYETHYLENE GLYCOL 3350 17 G/17G
17 POWDER, FOR SOLUTION ORAL DAILY
Status: DISCONTINUED | OUTPATIENT
Start: 2024-08-31 | End: 2024-09-04 | Stop reason: HOSPADM

## 2024-08-31 RX ORDER — MORPHINE SULFATE 4 MG/ML
4 INJECTION INTRAVENOUS EVERY 6 HOURS PRN
Status: DISCONTINUED | OUTPATIENT
Start: 2024-08-31 | End: 2024-09-04 | Stop reason: HOSPADM

## 2024-08-31 RX ORDER — HEPARIN SODIUM 10000 [USP'U]/100ML
0-4500 INJECTION, SOLUTION INTRAVENOUS CONTINUOUS
Status: DISCONTINUED | OUTPATIENT
Start: 2024-08-31 | End: 2024-09-03

## 2024-08-31 RX ORDER — VENLAFAXINE HYDROCHLORIDE 37.5 MG/1
37.5 CAPSULE, EXTENDED RELEASE ORAL DAILY
Status: DISCONTINUED | OUTPATIENT
Start: 2024-08-31 | End: 2024-09-04 | Stop reason: HOSPADM

## 2024-08-31 RX ORDER — METRONIDAZOLE 500 MG/1
500 TABLET ORAL 3 TIMES DAILY
COMMUNITY
Start: 2024-08-28 | End: 2024-09-07

## 2024-08-31 RX ORDER — BISACODYL 10 MG/1
10 SUPPOSITORY RECTAL DAILY PRN
Status: DISCONTINUED | OUTPATIENT
Start: 2024-08-31 | End: 2024-09-04 | Stop reason: HOSPADM

## 2024-08-31 RX ORDER — LAMOTRIGINE 100 MG/1
100 TABLET ORAL DAILY
Status: DISCONTINUED | OUTPATIENT
Start: 2024-08-31 | End: 2024-09-02

## 2024-08-31 RX ORDER — LAMOTRIGINE 100 MG/1
1 TABLET ORAL DAILY
COMMUNITY
Start: 2024-08-06 | End: 2024-09-04 | Stop reason: HOSPADM

## 2024-08-31 RX ORDER — GUAIFENESIN 600 MG/1
600 TABLET, EXTENDED RELEASE ORAL EVERY 12 HOURS PRN
Status: DISCONTINUED | OUTPATIENT
Start: 2024-08-31 | End: 2024-09-04 | Stop reason: HOSPADM

## 2024-08-31 RX ADMIN — MORPHINE SULFATE 4 MG: 4 INJECTION, SOLUTION INTRAMUSCULAR; INTRAVENOUS at 21:04

## 2024-08-31 RX ADMIN — SODIUM CHLORIDE, POTASSIUM CHLORIDE, SODIUM LACTATE AND CALCIUM CHLORIDE 1000 ML: 600; 310; 30; 20 INJECTION, SOLUTION INTRAVENOUS at 09:11

## 2024-08-31 RX ADMIN — CEPHALEXIN 500 MG: 500 CAPSULE ORAL at 16:13

## 2024-08-31 RX ADMIN — CEFTRIAXONE SODIUM 1 G: 1 INJECTION, SOLUTION INTRAVENOUS at 09:27

## 2024-08-31 RX ADMIN — METRONIDAZOLE 500 MG: 500 TABLET ORAL at 22:40

## 2024-08-31 RX ADMIN — IOHEXOL 75 ML: 350 INJECTION, SOLUTION INTRAVENOUS at 11:01

## 2024-08-31 RX ADMIN — MORPHINE SULFATE 4 MG: 4 INJECTION, SOLUTION INTRAMUSCULAR; INTRAVENOUS at 09:15

## 2024-08-31 RX ADMIN — VENLAFAXINE HYDROCHLORIDE 37.5 MG: 150 CAPSULE, EXTENDED RELEASE ORAL at 22:39

## 2024-08-31 RX ADMIN — CEPHALEXIN 500 MG: 500 CAPSULE ORAL at 22:40

## 2024-08-31 RX ADMIN — ONDANSETRON 4 MG: 2 INJECTION INTRAMUSCULAR; INTRAVENOUS at 09:15

## 2024-08-31 RX ADMIN — METRONIDAZOLE 500 MG: 500 TABLET ORAL at 16:13

## 2024-08-31 RX ADMIN — METRONIDAZOLE 500 MG: 500 INJECTION, SOLUTION INTRAVENOUS at 09:58

## 2024-08-31 RX ADMIN — ONDANSETRON 4 MG: 2 INJECTION INTRAMUSCULAR; INTRAVENOUS at 21:04

## 2024-08-31 RX ADMIN — IOHEXOL 75 ML: 350 INJECTION, SOLUTION INTRAVENOUS at 12:45

## 2024-08-31 RX ADMIN — VENLAFAXINE HYDROCHLORIDE 150 MG: 150 CAPSULE, EXTENDED RELEASE ORAL at 22:39

## 2024-08-31 RX ADMIN — HEPARIN SODIUM 1600 UNITS/HR: 10000 INJECTION, SOLUTION INTRAVENOUS at 13:56

## 2024-08-31 SDOH — SOCIAL STABILITY: SOCIAL INSECURITY: HAVE YOU HAD ANY THOUGHTS OF HARMING ANYONE ELSE?: NO

## 2024-08-31 SDOH — SOCIAL STABILITY: SOCIAL INSECURITY: ARE THERE ANY APPARENT SIGNS OF INJURIES/BEHAVIORS THAT COULD BE RELATED TO ABUSE/NEGLECT?: NO

## 2024-08-31 SDOH — SOCIAL STABILITY: SOCIAL INSECURITY: ARE YOU OR HAVE YOU BEEN THREATENED OR ABUSED PHYSICALLY, EMOTIONALLY, OR SEXUALLY BY ANYONE?: NO

## 2024-08-31 SDOH — SOCIAL STABILITY: SOCIAL INSECURITY: DO YOU FEEL ANYONE HAS EXPLOITED OR TAKEN ADVANTAGE OF YOU FINANCIALLY OR OF YOUR PERSONAL PROPERTY?: NO

## 2024-08-31 SDOH — SOCIAL STABILITY: SOCIAL INSECURITY: DO YOU FEEL UNSAFE GOING BACK TO THE PLACE WHERE YOU ARE LIVING?: NO

## 2024-08-31 SDOH — SOCIAL STABILITY: SOCIAL INSECURITY: ABUSE: ADULT

## 2024-08-31 SDOH — SOCIAL STABILITY: SOCIAL INSECURITY: HAVE YOU HAD THOUGHTS OF HARMING ANYONE ELSE?: NO

## 2024-08-31 SDOH — SOCIAL STABILITY: SOCIAL INSECURITY: DOES ANYONE TRY TO KEEP YOU FROM HAVING/CONTACTING OTHER FRIENDS OR DOING THINGS OUTSIDE YOUR HOME?: NO

## 2024-08-31 SDOH — SOCIAL STABILITY: SOCIAL INSECURITY: HAS ANYONE EVER THREATENED TO HURT YOUR FAMILY OR YOUR PETS?: NO

## 2024-08-31 ASSESSMENT — ACTIVITIES OF DAILY LIVING (ADL)
ADEQUATE_TO_COMPLETE_ADL: YES
PATIENT'S MEMORY ADEQUATE TO SAFELY COMPLETE DAILY ACTIVITIES?: YES
HEARING - RIGHT EAR: FUNCTIONAL
DRESSING YOURSELF: INDEPENDENT
GROOMING: INDEPENDENT
LACK_OF_TRANSPORTATION: NO
FEEDING YOURSELF: INDEPENDENT
WALKS IN HOME: INDEPENDENT
BATHING: INDEPENDENT
HEARING - LEFT EAR: FUNCTIONAL
TOILETING: INDEPENDENT
JUDGMENT_ADEQUATE_SAFELY_COMPLETE_DAILY_ACTIVITIES: YES

## 2024-08-31 ASSESSMENT — LIFESTYLE VARIABLES
EVER HAD A DRINK FIRST THING IN THE MORNING TO STEADY YOUR NERVES TO GET RID OF A HANGOVER: NO
HOW MANY STANDARD DRINKS CONTAINING ALCOHOL DO YOU HAVE ON A TYPICAL DAY: PATIENT DOES NOT DRINK
AUDIT-C TOTAL SCORE: 0
TOTAL SCORE: 0
EVER FELT BAD OR GUILTY ABOUT YOUR DRINKING: NO
PRESCIPTION_ABUSE_PAST_12_MONTHS: NO
HOW OFTEN DO YOU HAVE A DRINK CONTAINING ALCOHOL: NEVER
HAVE YOU EVER FELT YOU SHOULD CUT DOWN ON YOUR DRINKING: NO
HAVE PEOPLE ANNOYED YOU BY CRITICIZING YOUR DRINKING: NO
SKIP TO QUESTIONS 9-10: 1
AUDIT-C TOTAL SCORE: 0
SUBSTANCE_ABUSE_PAST_12_MONTHS: NO
HOW OFTEN DO YOU HAVE 6 OR MORE DRINKS ON ONE OCCASION: NEVER

## 2024-08-31 ASSESSMENT — PAIN SCALES - GENERAL
PAINLEVEL_OUTOF10: 5 - MODERATE PAIN
PAINLEVEL_OUTOF10: 5 - MODERATE PAIN
PAINLEVEL_OUTOF10: 6
PAINLEVEL_OUTOF10: 1
PAINLEVEL_OUTOF10: 1
PAINLEVEL_OUTOF10: 10 - WORST POSSIBLE PAIN

## 2024-08-31 ASSESSMENT — ENCOUNTER SYMPTOMS
VOMITING: 1
CHEST TIGHTNESS: 0
PALPITATIONS: 0
FLANK PAIN: 0
CHILLS: 0
WOUND: 0
CONSTIPATION: 0
FATIGUE: 0
WEAKNESS: 0
HEADACHES: 0
SHORTNESS OF BREATH: 0
COUGH: 0
ABDOMINAL PAIN: 1
NAUSEA: 1
WHEEZING: 0
BACK PAIN: 0
HEMATURIA: 0
TREMORS: 0
JOINT SWELLING: 0
FEVER: 0
DIARRHEA: 0

## 2024-08-31 ASSESSMENT — PATIENT HEALTH QUESTIONNAIRE - PHQ9
SUM OF ALL RESPONSES TO PHQ9 QUESTIONS 1 & 2: 0
2. FEELING DOWN, DEPRESSED OR HOPELESS: NOT AT ALL
1. LITTLE INTEREST OR PLEASURE IN DOING THINGS: NOT AT ALL

## 2024-08-31 ASSESSMENT — COGNITIVE AND FUNCTIONAL STATUS - GENERAL
DAILY ACTIVITIY SCORE: 24
PATIENT BASELINE BEDBOUND: NO
MOBILITY SCORE: 24

## 2024-08-31 ASSESSMENT — PAIN - FUNCTIONAL ASSESSMENT: PAIN_FUNCTIONAL_ASSESSMENT: 0-10

## 2024-08-31 ASSESSMENT — PAIN DESCRIPTION - LOCATION: LOCATION: ABDOMEN

## 2024-08-31 ASSESSMENT — PAIN DESCRIPTION - PROGRESSION: CLINICAL_PROGRESSION: GRADUALLY WORSENING

## 2024-08-31 NOTE — CARE PLAN
The patient's goals for the shift include      The clinical goals for the shift include Remain pain free throughout this shift.

## 2024-08-31 NOTE — H&P
[facility] - GENERAL MEDICINE HISTORY AND PHYSICAL    History Obtained From: Patient  Collateral History: Chart review    History Of Present Illness:  Olga Art is a 54 y.o. female with PMHx s/f large bowel obstruction s/p daphne procedure (pathology negative for malignancy), depression, bipolar 1 disorder, anemia presenting with abdominal pain. Pt had hx of daphne procedure, received end colostomy reversal, small bowel resection with anastomosis, lysis of adhesions and flexible sigmodioscopy on 8/13/2024 then hospitalized on 8/23/2024-8/28/2024 for sepsis with concern of post-op infection. She was found to be anemic and received blood transfusion. Negative CTAP findings and was discharged with 10 days of Keflex and Flagyl. She states her abdominal pain started last night, is constant and diffuse throughout.  Has associated nausea and vomiting.  She has only finished a day of her antibiotics, couldn't keep food/meds down with emesis.  Does not feel like her prior bowel obstruction.  She notes her bowel movements are slowly returning to normal function after the procedure and states to be still having loose bowel movements. Denies fever chills, lightheadedness, dizziness, chest pain, shortness of breath, changes in urinary symptoms, leg swelling, syncope.    ED Course (Summary):   Vitals on presentation: 98.4F, 108 bpm, 20 RR, 187/127, 97% on RA  Labs: CMP largely unremarkable  BNP 30, lipase less than 3, troponin negative  Coagulation-INR/PT 1.1/12.9  CBC-WBC 7.4, hemoglobin 10.2, neutrophils 5.45, platelets 709  UA negative  Imaging: CXR-No consolidations, sizeable effusions or pneumothorax. Tortuous and possibly ectatic aorta.  CT AP-Postoperative changes related to previous bowel surgery as above. No bowel obstruction or free air. Questionable acute pulmonary embolic disease within visualized right lung base.  CTA for PE-Right-sided pulmonary emboli. No CT signs of right heart strain.    Interventions: Zofran, morphine 4 mg, LR 1 L, heparin bolus and drip, Rocephin, Flagyl, admission for further management    ED Course (From ED Provider):  ED Course as of 08/31/24 1605   Sat Aug 31, 2024   0907 Given that the patient is unable to tolerate her oral medications at this time, IV doses of her antibiotics were ordered. [WJ]   0907 Patient twelve-lead EKG interpreted by myself shows sinus tachycardia, ventricular 104, normal CT interval, normal QRS duration, normal axis, normal QT, no STEMI. [WJ]   0918 WBC: 7.4 [WJ]   0954 LIPASE(!): <3 [WJ]   0954 Lactate: 1.0 [WJ]   1153 Patient reevaluated.  Pain controlled.  Patient feeling well.  Informed that CT scan showed possible pulmonary embolism will need additional CT scan [WJ]   1309 Troponin I, High Sensitivity: 6 [WJ]   1309 BNP: 30 [WJ]      ED Course User Index  [WJ] Joaquin Salcedo DO         Diagnoses as of 08/31/24 1605   Pulmonary embolism without acute cor pulmonale, unspecified chronicity, unspecified pulmonary embolism type (Multi)   Epigastric pain     Relevant Results  Results for orders placed or performed during the hospital encounter of 08/31/24 (from the past 24 hour(s))   Lipase   Result Value Ref Range    Lipase <3 (L) 9 - 82 U/L   Comprehensive Metabolic Panel   Result Value Ref Range    Glucose 125 (H) 74 - 99 mg/dL    Sodium 136 136 - 145 mmol/L    Potassium 4.1 3.5 - 5.3 mmol/L    Chloride 101 98 - 107 mmol/L    Bicarbonate 26 21 - 32 mmol/L    Anion Gap 13 10 - 20 mmol/L    Urea Nitrogen 10 6 - 23 mg/dL    Creatinine 0.72 0.50 - 1.05 mg/dL    eGFR >90 >60 mL/min/1.73m*2    Calcium 8.8 8.6 - 10.3 mg/dL    Albumin 3.9 3.4 - 5.0 g/dL    Alkaline Phosphatase 57 33 - 110 U/L    Total Protein 7.4 6.4 - 8.2 g/dL    AST 17 9 - 39 U/L    Bilirubin, Total 0.4 0.0 - 1.2 mg/dL    ALT 14 7 - 45 U/L   CBC and Auto Differential   Result Value Ref Range    WBC 7.4 4.4 - 11.3 x10*3/uL    nRBC 0.0 0.0 - 0.0 /100 WBCs    RBC 3.57 (L) 4.00 - 5.20  x10*6/uL    Hemoglobin 10.2 (L) 12.0 - 16.0 g/dL    Hematocrit 32.3 (L) 36.0 - 46.0 %    MCV 91 80 - 100 fL    MCH 28.6 26.0 - 34.0 pg    MCHC 31.6 (L) 32.0 - 36.0 g/dL    RDW 15.9 (H) 11.5 - 14.5 %    Platelets 709 (H) 150 - 450 x10*3/uL    Neutrophils % 73.2 40.0 - 80.0 %    Immature Granulocytes %, Automated 0.3 0.0 - 0.9 %    Lymphocytes % 17.9 13.0 - 44.0 %    Monocytes % 8.2 2.0 - 10.0 %    Eosinophils % 0.1 0.0 - 6.0 %    Basophils % 0.3 0.0 - 2.0 %    Neutrophils Absolute 5.45 1.20 - 7.70 x10*3/uL    Immature Granulocytes Absolute, Automated 0.02 0.00 - 0.70 x10*3/uL    Lymphocytes Absolute 1.33 1.20 - 4.80 x10*3/uL    Monocytes Absolute 0.61 0.10 - 1.00 x10*3/uL    Eosinophils Absolute 0.01 0.00 - 0.70 x10*3/uL    Basophils Absolute 0.02 0.00 - 0.10 x10*3/uL   Lactate   Result Value Ref Range    Lactate 1.0 0.4 - 2.0 mmol/L   Troponin I, High Sensitivity   Result Value Ref Range    Troponin I, High Sensitivity 6 0 - 13 ng/L   B-type natriuretic peptide   Result Value Ref Range    BNP 30 0 - 99 pg/mL   Urinalysis with Reflex Culture and Microscopic   Result Value Ref Range    Color, Urine Light-Yellow Light-Yellow, Yellow, Dark-Yellow    Appearance, Urine Clear Clear    Specific Gravity, Urine 1.036 (N) 1.005 - 1.035    pH, Urine 7.0 5.0, 5.5, 6.0, 6.5, 7.0, 7.5, 8.0    Protein, Urine NEGATIVE NEGATIVE, 10 (TRACE), 20 (TRACE) mg/dL    Glucose, Urine Normal Normal mg/dL    Blood, Urine NEGATIVE NEGATIVE    Ketones, Urine 10 (1+) (A) NEGATIVE mg/dL    Bilirubin, Urine NEGATIVE NEGATIVE    Urobilinogen, Urine Normal Normal mg/dL    Nitrite, Urine NEGATIVE NEGATIVE    Leukocyte Esterase, Urine 25 Frank/µL (A) NEGATIVE   Microscopic Only, Urine   Result Value Ref Range    WBC, Urine 1-5 1-5, NONE /HPF    RBC, Urine 3-5 NONE, 1-2, 3-5 /HPF    Squamous Epithelial Cells, Urine 1-9 (SPARSE) Reference range not established. /HPF   Coagulation Screen   Result Value Ref Range    Protime 12.9 (H) 9.8 - 12.8 seconds    INR  1.1 0.9 - 1.1    aPTT 29 27 - 38 seconds      CT angio chest for pulmonary embolism    Result Date: 8/31/2024  Interpreted By:  Keegan Solo, STUDY: CT ANGIO CHEST FOR PULMONARY EMBOLISM;  8/31/2024 12:45 pm   INDICATION: Signs/Symptoms:Possible PE seen on CT.   COMPARISON: CT abdomen and pelvis from same day.   ACCESSION NUMBER(S): [id number]   ORDERING CLINICIAN: ERIC LEIVA   TECHNIQUE: Helical data acquisition of the chest was obtained with IV contrast material.  Images were reformatted in axial, coronal, and sagittal planes.   FINDINGS: Lungs and Pleura: Bibasilar and lingular atelectasis. No pulmonary consolidation. No pleural effusion. No pneumothorax.   Mediastinum and axilla: Right lower lobe and right upper lobe pulmonary emboli extending is proximally to the lobar branch point. No pulmonary artery enlargement or right ventricular enlargement to indicate right heart strain. No adenopathy by CT size criteria. No cardiomegaly or pericardial effusion. No thoracic aortic aneurysm. Atherosclerosis. Advanced coronary artery calcifications.   Visualized Upper Abdomen: Refer to dedicated CT abdomen and pelvis from same day for details.   MSK/Chest Wall: No aggressive bony lesion identified. Multilevel degenerative changes in the spine.       Right-sided pulmonary emboli. No CT signs of right heart strain.     Keegan Solo sent epic message to  ERIC LEIVA on 8/31/2024 at 1:08 pm.  (**-RCF-**) Findings:  See findings.       Signed by: Keegan Solo 8/31/2024 1:14 PM Dictation workstation:   [id number]    CT abdomen pelvis w IV contrast    Result Date: 8/31/2024  Interpreted By:  Srikanth Andres, STUDY: CT ABDOMEN PELVIS W IV CONTRAST;  8/31/2024 11:11 am   INDICATION: Signs/Symptoms:History of bowel obstruction status post colostomy with reversal complicated by recent infection, still on antibiotics. 1 day of diffuse abdominal pain, lack of bowel movement, and nausea/vomiting..   COMPARISON: None.    ACCESSION NUMBER(S): [id number]   ORDERING CLINICIAN: ERIC LEIVA   TECHNIQUE: CT of the abdomen and pelvis was performed.  Standard contiguous axial images were obtained at 3 mm slice thickness through the abdomen and pelvis. Coronal and sagittal reconstructions at 3 mm slice thickness were performed.   75  ml of contrast Omnipaque 350 were administered intravenously without immediate complication.   FINDINGS: LOWER CHEST: Questionable incidental filling defect within the right lower lobe pulmonary artery branches concerning for acute aortic pulmonary embolic disease. Evaluation is limited. Minimal lingular atelectasis, scarring or mild infiltrate, incompletely included in field of view. The remainder of the visualized lung bases otherwise clear.   ABDOMEN:   LIVER: Liver is normal in size. No focal lesions are seen.   BILE DUCTS: Biliary system is nondilated.   GALLBLADDER: The gallbladder has been surgically removed.   PANCREAS: No focal lesions. No peripancreatic fat stranding.   SPLEEN: The spleen is normal in size. No focal abnormalities are seen.   ADRENAL GLANDS: The adrenal glands bilaterally within normal limits.   KIDNEYS AND URETERS: No hydronephrosis or renal masses. No perinephric stranding. No radiodense renal calculi.   PELVIS:   BLADDER: Within normal limits as distended. No bladder calculi or masses.   REPRODUCTIVE ORGANS: The uterus is not seen and may have been surgically removed. No pelvic free fluid, masses or lymphadenopathy   BOWEL: Surgical anastomotic sutures in the rectosigmoid and central lower abdominal small bowel. Small and large bowel are nondilated. Surgical scars within the midline ventral abdominal wall and to the left of midline related to previous colostomy site. The appendix is not positively identified, however, no findings to suggest acute appendicitis is seen. No mesenteric edema or lymphadenopathy. No fluid collections.   VESSELS: Mild wall calcification of the aorta. No  aneurysm or dissection.   PERITONEUM/RETROPERITONEUM/LYMPH NODES: No retroperitoneal masses are seen.  No lymphadenopathy.   BONES AND ABDOMINAL WALL: There is no evidence for destructive lytic or blastic bone lesions identified.  Mild multilevel discogenic degenerative changes.       1.  Postoperative changes related to previous bowel surgery as above. No bowel obstruction or free air. 2. Questionable acute pulmonary embolic disease within visualized right lung base. Study not optimized for evaluation of pulmonary artery branches. Clinical correlation recommended. Further evaluation with chest CT can be performed for better assessment as clinically warranted. 3. Additional detailed findings as above.   SUPPLEMENTAL INFORMATION: Notifi message was left for ERIC LEIVA regarding this exam by Dr. Andres on 8/31/2024 at approximately 11:43 hours. Critical Finding: See findings. Notification was initiated on 8/31/2024 at 11:43 am by Srikanth Andres.  (**-YCF-**) Instructions:   Signed by: Srikanth Andres 8/31/2024 11:46 AM Dictation workstation:   [id number]    XR chest 1 view    Result Date: 8/31/2024  Interpreted By:  Srikanth Andres, STUDY: XR CHEST 1 VIEW;; 8/31/2024 10:40 am   INDICATION: Signs/Symptoms:abdominal pain.   COMPARISON: CT abdomen from 08/23/2024   ACCESSION NUMBER(S): [id number]   ORDERING CLINICIAN: ERIC LEIVA   FINDINGS: Tortuous and possibly ectatic aorta. Evaluation limited to portable technique and positioning. Dissection can not be excluded in the basis of this exam. Left basilar atelectasis or scarring. No consolidations, sizeable effusions or pneumothorax. Degenerative changes of bilateral AC joints.       1. No consolidations, sizeable effusions or pneumothorax.   2. Tortuous and possibly ectatic aorta. Acute aortic pathology not excluded in the basis of this exam only. If such clinical concern persists, further evaluation with chest CT should be considered for better assessment.       Signed  by: Srikanth Andres 8/31/2024 11:12 AM Dictation workstation:   [id number]    Scheduled medications:  cephalexin, 500 mg, oral, 4x daily  lamoTRIgine, 100 mg, oral, Daily  metroNIDAZOLE, 500 mg, oral, TID  mirtazapine, 15 mg, oral, Nightly  [START ON 9/1/2024] pantoprazole, 40 mg, oral, Daily before breakfast   Or  [START ON 9/1/2024] pantoprazole, 40 mg, intravenous, Daily before breakfast  polyethylene glycol, 17 g, oral, Daily  venlafaxine XR, 150 mg, oral, Daily  venlafaxine XR, 37.5 mg, oral, Daily      Continuous medications:  heparin, 0-4,500 Units/hr, Last Rate: 1,600 Units/hr (08/31/24 1356)      PRN medications:  PRN medications: acetaminophen, bisacodyl, bisacodyl, guaiFENesin, heparin, melatonin, ondansetron     Past Medical History  She has no past medical history on file.    Surgical History  She has no past surgical history on file.     Social History  She reports that she has never smoked. She has never used smokeless tobacco. She reports that she does not currently use alcohol. She reports current drug use. Drug: Marijuana.    Family History  No family history on file.    Allergies  Celery seed, Penicillins, and Tramadol    Code Status  Full Code     Review of Systems   Constitutional:  Negative for chills, fatigue and fever.   Respiratory:  Negative for cough, chest tightness, shortness of breath and wheezing.    Cardiovascular:  Negative for chest pain, palpitations and leg swelling.   Gastrointestinal:  Positive for abdominal pain, nausea and vomiting. Negative for constipation and diarrhea.   Genitourinary:  Negative for flank pain and hematuria.   Musculoskeletal:  Negative for back pain and joint swelling.   Skin:  Negative for rash and wound.   Neurological:  Negative for tremors, syncope, weakness and headaches.       Last Recorded Vitals  BP (!) 134/91 (BP Location: Left arm, Patient Position: Sitting)   Pulse 90   Temp 36.7 °C (98.1 °F) (Temporal)   Resp 16   Wt 86.3 kg (190 lb 4.1 oz)    SpO2 92%      Physical Exam:  Vital signs and nursing notes reviewed.   Constitutional: Pleasant and cooperative. Laying in bed in no acute distress. Conversant.   Skin: Warm and dry; no obvious lesions, rashes, pallor, or jaundice. Good turgor.   Eyes: EOMI. Anicteric sclera.   ENT: Mucous membranes moist; no obvious injury or deformity appreciated.   Head and Neck: Normocephalic, atraumatic. ROM preserved. Trachea midline. No appreciable JVD.   Respiratory: Nonlabored on RA. Lungs clear to auscultation bilaterally without obvious adventitious sounds. Chest rise is equal.  Cardiovascular: RRR. No gross murmur, gallop, or rub. Extremities are warm and well-perfused with good capillary refill (< 3 seconds). No chest wall tenderness.   GI: Abdomen surgical incision noted.  Abdomen tender diffuse throughout.  The site for previous colostomy bag is draining yellow fluids, no odor or cellulitic changes noted. Abdomen soft, nondistended. No obvious organomegaly appreciated. Bowel sounds are present and normoactive.  : No CVA tenderness.   MSK: No gross abnormalities appreciated. No limitations to AROM/PROM appreciated.   Extremities: No cyanosis, edema, or clubbing evident. Neurovascularly intact.   Neuro: A&Ox3. CN 2-12 grossly intact. Able to respond to questions appropriately and clearly. No acute focal neurologic deficits appreciated.  Psych: Appropriate mood and behavior.    Assessment/Plan     54 y.o. female with PMHx s/f large bowel obstruction s/p daphne procedure (pathology negative for malignancy), depression, anemia presenting with abdominal pain.     Pulmonary embolism without acute cor pulmonale  -presumed provoked 2/2 to recent abdominal surgery and periods of immobilization  -Vascular US LE bilateral pending  -Received heparin bolus, continue on heparin drip    Abdominal pain  -CT AP-Postoperative changes related to previous bowel surgery as above. No bowel obstruction or free air.   -no fever, normal  WBC  -Continue on p.o. Keflex and Flagyl    Anemia  -Hgb 10.2, stable and platelets improving at 709 compared to 523 from last hospitalization on 8/22/2024  -Continue to trend while admitted and transfuse as necessary    Depression  -Continue on home venlafaxine    Bipolar 1 disorder  -Continue on lamotrigine    Diet: Cardiac  DVT Prophylaxis: Heparin  Code Status: Full code     Cely Broussard PA-C    Dragon dictation software was used to dictate this note and thus there may be minor errors in translation/transcription including garbled speech or misspellings. Please contact for clarification if needed.

## 2024-08-31 NOTE — PROGRESS NOTES
Olga Art is a 54 y.o. female admitted for Pulmonary embolism without acute cor pulmonale, unspecified chronicity, unspecified pulmonary embolism type (Multi). Pharmacy reviewed the patient's thyuk-ry-ewkifjbaj medications and allergies for accuracy.    The list below reflects the PTA list prior to pharmacy medication history. A summary a changes to the PTA medication list has been listed below. Please review each medication in order reconciliation for additional clarification and justification.    Source of information:  Discharge Paperwork 08/28/24 from [facility]    Medications added:  Flagyl 500mg q8 x10 days  Keflex 500mg qid x10 days    Medications modified:    Medications to be removed:    Medications of concern:      Prior to Admission Medications   Prescriptions Last Dose Informant Patient Reported? Taking?   lamoTRIgine (LaMICtal) 100 mg tablet   Yes Yes   Sig: Take 1 tablet (100 mg) by mouth once daily.   mirtazapine (Remeron) 15 mg tablet   Yes No   Sig: Take 1 tablet (15 mg) by mouth once daily at bedtime.   venlafaxine 150 mg 24 hr tablet   Yes Yes   Sig: Take 1 tablet (150 mg) by mouth once daily with breakfast.   venlafaxine XR (Effexor-XR) 37.5 mg 24 hr capsule   Yes Yes   Sig: Take 1 capsule (37.5 mg) by mouth once daily.      Facility-Administered Medications: None       Jessika Pisano

## 2024-08-31 NOTE — ED NOTES
Pt arrives to ED via ambulance from home with c/o abdominal pain, nausea, and vomiting.    Code Status:  No Order    HPI     Chief Complaint   Patient presents with    Abdominal Pain     C/O abdominal pain, nausea, and vomiting.       BP (!) 136/92 (BP Location: Left arm, Patient Position: Sitting)   Pulse 92   Temp 36.9 °C (98.4 °F) (Tympanic)   Resp 18   Wt 86.3 kg (190 lb 4.1 oz)   SpO2 98%     No data recorded    LDA:   Peripheral IV 08/31/24 20 G Right;Posterior Hand (Active)   Placement Date/Time: 08/31/24 0902   Hand Hygiene Completed: Yes  Size (Gauge): 20 G  Orientation: Right;Posterior  Location: Hand  Site Prep: Chlorhexidine    Number of days: 0       Peripheral IV 08/31/24 20 G Right Antecubital (Active)   Placement Date/Time: 08/31/24 1154   Hand Hygiene Completed: Yes  Size (Gauge): 20 G  Orientation: Right  Location: Antecubital  Site Prep: Chlorhexidine    Number of days: 0       Peripheral IV 08/31/24 20 G Left Antecubital (Active)   Placement Date/Time: 08/31/24 1231   Size (Gauge): 20 G  Orientation: Left  Location: Antecubital   Number of days: 0       External Urinary Catheter Female (Active)   Placement Date/Time: 08/31/24 0930   Placed by: Emi  Hand Hygiene Completed: Yes  External Catheter Type: Female   Number of days: 0        BACKGROUND  No past medical history on file.  No past surgical history on file.  No current facility-administered medications on file prior to encounter.     No current outpatient medications on file prior to encounter.        ASSESSMENT  ED Course as of 08/31/24 1410   Sat Aug 31, 2024   0907 Given that the patient is unable to tolerate her oral medications at this time, IV doses of her antibiotics were ordered. [WJ]   0907 Patient twelve-lead EKG interpreted by myself shows sinus tachycardia, ventricular 104, normal NV interval, normal QRS duration, normal axis, normal QT, no STEMI. [WJ]   0918 WBC: 7.4 [WJ]   0954 LIPASE(!): <3 [WJ]   0954 Lactate: 1.0  [WJ]   1153 Patient reevaluated.  Pain controlled.  Patient feeling well.  Informed that CT scan showed possible pulmonary embolism will need additional CT scan [WJ]   1309 Troponin I, High Sensitivity: 6 [WJ]   1309 BNP: 30 [WJ]      ED Course User Index  [WJ] Joaquin Salcedo DO       Medications Currently Running:  heparin, 0-4,500 Units/hr, Last Rate: 1,600 Units/hr (08/31/24 1356)         Medications Given:  ED Medication Administration from 08/31/2024 0849 to 08/31/2024 1410         Date/Time Order Dose Route Action Action by     08/31/2024 0911 EDT lactated Ringer's bolus 1,000 mL 1,000 mL intravenous New Bag Alanis, AMY     08/31/2024 0915 EDT morphine injection 4 mg 4 mg intravenous Given AlanisAMY     08/31/2024 0915 EDT ondansetron (Zofran) injection 4 mg 4 mg intravenous Given AlanisAMY     08/31/2024 0927 EDT cefTRIAXone (Rocephin) 1 g in dextrose (iso) IV 50 mL 1 g intravenous New Bag Alanis, AMY     08/31/2024 0954 EDT cefTRIAXone (Rocephin) 1 g in dextrose (iso) IV 50 mL 0 g intravenous Stopped Alanis, AMY     08/31/2024 0954 EDT lactated Ringer's bolus 1,000 mL 0 mL intravenous Stopped Alanis, AMY     08/31/2024 0958 EDT metroNIDAZOLE (Flagyl) 500 mg in sodium chloride (iso)  mL 500 mg intravenous New Bag Alanis, AMY     08/31/2024 1022 EDT metroNIDAZOLE (Flagyl) 500 mg in sodium chloride (iso)  mL 0 mg intravenous Stopped Johnston, AMY     08/31/2024 1101 EDT iohexol (OMNIPaque) 350 mg iodine/mL solution 75 mL 75 mL intravenous Given XUAN Carrizales     08/31/2024 1245 EDT iohexol (OMNIPaque) 350 mg iodine/mL solution 75 mL 75 mL intravenous Given XUAN Carrizales     08/31/2024 1356 EDT heparin 25,000 Units in dextrose 5% 250 mL (100 Units/mL) infusion (premix) 1,600 Units/hr intravenous New Bag Annabelle, AMY     08/31/2024 1357 EDT heparin bolus from bag 6,904 Units 6,904 Units intravenous Bolus from Bag AMY Alanis                 RESULTS    Imaging:  CT angio chest for pulmonary embolism   Final  Result   Right-sided pulmonary emboli. No CT signs of right heart strain.             Keegan Solo sent epic message to  ERIC ABBIE on 8/31/2024 at   1:08 pm.  (**-RCF-**) Findings:  See findings.                  Signed by: Keegan Solo 8/31/2024 1:14 PM   Dictation workstation:   [id number]      CT abdomen pelvis w IV contrast   Final Result   1.  Postoperative changes related to previous bowel surgery as above.   No bowel obstruction or free air.   2. Questionable acute pulmonary embolic disease within visualized   right lung base. Study not optimized for evaluation of pulmonary   artery branches. Clinical correlation recommended. Further evaluation   with chest CT can be performed for better assessment as clinically   warranted.   3. Additional detailed findings as above.        SUPPLEMENTAL INFORMATION:   Notifi message was left for ERIC LEIVA regarding this exam by Dr. Andres on 8/31/2024 at approximately 11:43 hours. Critical Finding:   See findings. Notification was initiated on 8/31/2024 at 11:43 am by   Srikanth Andres.  (**-YCF-**) Instructions:        Signed by: Srikanth Andres 8/31/2024 11:46 AM   Dictation workstation:   [id number]      XR chest 1 view   Final Result   1. No consolidations, sizeable effusions or pneumothorax.        2. Tortuous and possibly ectatic aorta. Acute aortic pathology not   excluded in the basis of this exam only. If such clinical concern   persists, further evaluation with chest CT should be considered for   better assessment.                  Signed by: Srikanth Andres 8/31/2024 11:12 AM   Dictation workstation:   [id number]         }  Labs ::99  Abnormal Labs Reviewed   LIPASE - Abnormal; Notable for the following components:       Result Value    Lipase <3 (*)     All other components within normal limits    Narrative:     Venipuncture immediately after or during the administration of Metamizole may lead to falsely low results. Testing should be performed  immediately prior to Metamizole dosing.   COMPREHENSIVE METABOLIC PANEL - Abnormal; Notable for the following components:    Glucose 125 (*)     All other components within normal limits   CBC WITH AUTO DIFFERENTIAL - Abnormal; Notable for the following components:    RBC 3.57 (*)     Hemoglobin 10.2 (*)     Hematocrit 32.3 (*)     MCHC 31.6 (*)     RDW 15.9 (*)     Platelets 709 (*)     All other components within normal limits   URINALYSIS WITH REFLEX CULTURE AND MICROSCOPIC - Abnormal; Notable for the following components:    Specific Gravity, Urine 1.036 (*)     Ketones, Urine 10 (1+) (*)     Leukocyte Esterase, Urine 25 Frank/µL (*)     All other components within normal limits                 Rody Alanis RN  08/31/24 3603

## 2024-08-31 NOTE — ED TRIAGE NOTES
Pt to ER via ambulance with c/o abdominal pain, nausea, and vomiting. Pt states she had surgery for colostomy reversal last month.

## 2024-08-31 NOTE — ED PROVIDER NOTES
HPI   No chief complaint on file.      HPI    HISTORY OF PRESENT ILLNESS:  Patient is a 54-year-old female with history significant for recent small bowel obstruction status post colostomy complicated by infection currently on antibiotics who presents to the emergency department by EMS for abdominal pain.  Patient starting last evening started to have nausea, vomiting.  Abdominal pain was diffuse throughout.  Has had multiple episodes of emesis despite taking Zofran.  Did take her evening antibiotics but has not taken this morning's.  Does not feel like her prior bowel obstruction.  Patient has not noted any lower leg swelling, fever, chills    Patient had been hospitalized from August 24 to August 28 and was discharged on 10-day course of Keflex and Flagyl.  Patient is on Lamictal, Effexor    Past Medical History: Recent small bowel obstruction status post Jacobs, bipolar type I, depression, obesity class I, anemia  Past Surgical History: Cholecystectomy, colostomy due to bowel obstruction, ostomy reversal      __________________________________________________________  PHYSICAL EXAM:    Appearance: Alert, oriented , cooperative   Skin: Intact,  dry skin, no lesions, rash, petechiae or purpura.   Eyes: PERRLA, EOMs intact,  Conjunctiva pink with no redness or exudates.    HENT: Normocephalic, atraumatic. Nares patent   Neck: Supple. Trachea at midline.   Pulmonary: Lung sounds are clear bilaterally.  There is no rales, rhonchi, or wheezing.  Cardiac: Regular rate and rhythm, no rubs, murmurs, or gallops. No JVD,   Abdomen: Abdomen is soft,  and nondistended.  Diffuse abdominal tenderness present, left side worse than right.  Dressing intact, no active drainage or surrounding cellulitis  Genitourinary: Exam deferred.  Musculoskeletal: no edema, pain, cyanosis, or deformity in extremities. Pulses full and equal.   Neurological:  Cranial nerves are grossly intact, grossly normal sensation, no weakness, no focal  findings identified.    __________________________________________________________  MEDICAL DECISION MAKING:    Patient was seen and examined. Differential diagnosis for Abdominal pain, nausea vomiting includes bowel obstruction, worsening infection despite outpatient antibiotics, sinusitis.  The patient has had recent repeat hospitalizations regarding her small bowel obstruction with complicating infection.  Patient is currently on antibiotics but has not been able to take today's dose.  She does not appear to have an obvious infection on the abdominal wall.  Will obtain labs in addition to CT abdomen.  Patient did not have a leukocytosis.  CT scan did ultimately show that there was potentially a pulmonary embolism.  No intra-abdominal disease.  After the first dose of pain meds, patient pain was controlled.  She was informed of CT and repeat CT PE study was ordered.  CT PE study was positive for pulm embolism without evidence of right heart strain.  Added on labs of troponin and BNP also did not show evidence of right heart strain.  Shared decision making performed with family member and patient.  The patient has had multiple bounce backs.  Also recent hospitalization did require hospitalization and blood transfusion.  Therefore, we will have the patient heparinized and observed.  Case discussed with IMS who agreeable with having the patient admitted.      Chronic Medical Conditions Significantly Affecting Care: Recent small bowel obstruction status post Jacobs, bipolar type I, depression, obesity class I, anemia    External Records Reviewed: I reviewed recent and relevant outside records includin patient discharge summary from August 28, 2024    Joaquin Salcedo  Emergency Medicine    Patient History   No past medical history on file.  No past surgical history on file.  No family history on file.  Social History     Tobacco Use    Smoking status: Never    Smokeless tobacco: Never   Substance Use Topics    Alcohol  use: Not Currently    Drug use: Yes     Types: Marijuana       Physical Exam   ED Triage Vitals [08/31/24 0855]   Temperature Heart Rate Respirations BP   36.9 °C (98.4 °F) (!) 108 (!) 22 (!) 187/127      Pulse Ox Temp Source Heart Rate Source Patient Position   97 % Tympanic -- Sitting      BP Location FiO2 (%)     Left arm --       Physical Exam      ED Course & [facility]   ED Course as of 08/31/24 1544   Sat Aug 31, 2024   0907 Given that the patient is unable to tolerate her oral medications at this time, IV doses of her antibiotics were ordered. [WJ]   0907 Patient twelve-lead EKG interpreted by myself shows sinus tachycardia, ventricular 104, normal NV interval, normal QRS duration, normal axis, normal QT, no STEMI. [WJ]   0918 WBC: 7.4 [WJ]   0954 LIPASE(!): <3 [WJ]   0954 Lactate: 1.0 [WJ]   1153 Patient reevaluated.  Pain controlled.  Patient feeling well.  Informed that CT scan showed possible pulmonary embolism will need additional CT scan [WJ]   1309 Troponin I, High Sensitivity: 6 [WJ]   1309 BNP: 30 [WJ]      ED Course User Index  [WJ] Joaquin Salcedo DO         Diagnoses as of 08/31/24 1544   Pulmonary embolism without acute cor pulmonale, unspecified chronicity, unspecified pulmonary embolism type (Multi)   Epigastric pain                 No data recorded                                 Medical Decision Making      Procedure  Critical Care    Performed by: Joaquin Salcedo DO  Authorized by: Joaquin Salcedo DO    Critical care provider statement:     Critical care time (minutes):  20    Critical care time was exclusive of:  Separately billable procedures and treating other patients and teaching time    Critical care was time spent personally by me on the following activities:  Blood draw for specimens, development of treatment plan with patient or surrogate, evaluation of patient's response to treatment, examination of patient, ordering and review of laboratory studies, ordering and performing treatments  and interventions, ordering and review of radiographic studies, re-evaluation of patient's condition and review of old charts    Care discussed with: admitting provider         Joaquin Salcedo DO  08/31/24 4489

## 2024-09-01 VITALS
BODY MASS INDEX: 29.86 KG/M2 | HEART RATE: 88 BPM | TEMPERATURE: 97.3 F | WEIGHT: 190.26 LBS | DIASTOLIC BLOOD PRESSURE: 91 MMHG | HEIGHT: 67 IN | SYSTOLIC BLOOD PRESSURE: 135 MMHG | RESPIRATION RATE: 16 BRPM | OXYGEN SATURATION: 96 %

## 2024-09-01 LAB
ALBUMIN SERPL BCP-MCNC: 3.5 G/DL (ref 3.4–5)
ALP SERPL-CCNC: 48 U/L (ref 33–110)
ALT SERPL W P-5'-P-CCNC: 15 U/L (ref 7–45)
ANION GAP SERPL CALC-SCNC: 12 MMOL/L (ref 10–20)
AST SERPL W P-5'-P-CCNC: 19 U/L (ref 9–39)
BACTERIA BLD CULT: NORMAL
BACTERIA BLD CULT: NORMAL
BACTERIA UR CULT: NORMAL
BASOPHILS # BLD AUTO: 0.04 X10*3/UL (ref 0–0.1)
BASOPHILS NFR BLD AUTO: 0.5 %
BILIRUB SERPL-MCNC: 0.3 MG/DL (ref 0–1.2)
BUN SERPL-MCNC: 8 MG/DL (ref 6–23)
CALCIUM SERPL-MCNC: 8.9 MG/DL (ref 8.6–10.3)
CHLORIDE SERPL-SCNC: 102 MMOL/L (ref 98–107)
CO2 SERPL-SCNC: 26 MMOL/L (ref 21–32)
CREAT SERPL-MCNC: 0.67 MG/DL (ref 0.5–1.05)
EGFRCR SERPLBLD CKD-EPI 2021: >90 ML/MIN/1.73M*2
EOSINOPHIL # BLD AUTO: 0.08 X10*3/UL (ref 0–0.7)
EOSINOPHIL NFR BLD AUTO: 1.1 %
ERYTHROCYTE [DISTWIDTH] IN BLOOD BY AUTOMATED COUNT: 15.9 % (ref 11.5–14.5)
GLUCOSE SERPL-MCNC: 125 MG/DL (ref 74–99)
HCT VFR BLD AUTO: 29.8 % (ref 36–46)
HGB BLD-MCNC: 9.1 G/DL (ref 12–16)
HOLD SPECIMEN: NORMAL
IMM GRANULOCYTES # BLD AUTO: 0.02 X10*3/UL (ref 0–0.7)
IMM GRANULOCYTES NFR BLD AUTO: 0.3 % (ref 0–0.9)
LYMPHOCYTES # BLD AUTO: 1.73 X10*3/UL (ref 1.2–4.8)
LYMPHOCYTES NFR BLD AUTO: 23.1 %
MCH RBC QN AUTO: 28.3 PG (ref 26–34)
MCHC RBC AUTO-ENTMCNC: 30.5 G/DL (ref 32–36)
MCV RBC AUTO: 93 FL (ref 80–100)
MONOCYTES # BLD AUTO: 0.63 X10*3/UL (ref 0.1–1)
MONOCYTES NFR BLD AUTO: 8.4 %
NEUTROPHILS # BLD AUTO: 4.99 X10*3/UL (ref 1.2–7.7)
NEUTROPHILS NFR BLD AUTO: 66.6 %
NRBC BLD-RTO: 0 /100 WBCS (ref 0–0)
PLATELET # BLD AUTO: 603 X10*3/UL (ref 150–450)
POTASSIUM SERPL-SCNC: 4 MMOL/L (ref 3.5–5.3)
PROT SERPL-MCNC: 6.6 G/DL (ref 6.4–8.2)
RBC # BLD AUTO: 3.22 X10*6/UL (ref 4–5.2)
SODIUM SERPL-SCNC: 136 MMOL/L (ref 136–145)
UFH PPP CHRO-ACNC: 0.7 IU/ML
WBC # BLD AUTO: 7.5 X10*3/UL (ref 4.4–11.3)

## 2024-09-01 PROCEDURE — 80053 COMPREHEN METABOLIC PANEL: CPT

## 2024-09-01 PROCEDURE — 2500000004 HC RX 250 GENERAL PHARMACY W/ HCPCS (ALT 636 FOR OP/ED): Performed by: INTERNAL MEDICINE

## 2024-09-01 PROCEDURE — 2500000004 HC RX 250 GENERAL PHARMACY W/ HCPCS (ALT 636 FOR OP/ED)

## 2024-09-01 PROCEDURE — 85520 HEPARIN ASSAY: CPT | Performed by: INTERNAL MEDICINE

## 2024-09-01 PROCEDURE — 99233 SBSQ HOSP IP/OBS HIGH 50: CPT | Performed by: HOSPITALIST

## 2024-09-01 PROCEDURE — 2500000004 HC RX 250 GENERAL PHARMACY W/ HCPCS (ALT 636 FOR OP/ED): Performed by: EMERGENCY MEDICINE

## 2024-09-01 PROCEDURE — 2500000001 HC RX 250 WO HCPCS SELF ADMINISTERED DRUGS (ALT 637 FOR MEDICARE OP)

## 2024-09-01 PROCEDURE — 85025 COMPLETE CBC W/AUTO DIFF WBC: CPT

## 2024-09-01 PROCEDURE — 2500000004 HC RX 250 GENERAL PHARMACY W/ HCPCS (ALT 636 FOR OP/ED): Mod: JZ | Performed by: HOSPITALIST

## 2024-09-01 PROCEDURE — 36415 COLL VENOUS BLD VENIPUNCTURE: CPT

## 2024-09-01 PROCEDURE — 1200000002 HC GENERAL ROOM WITH TELEMETRY DAILY

## 2024-09-01 RX ORDER — METRONIDAZOLE 500 MG/100ML
500 INJECTION, SOLUTION INTRAVENOUS EVERY 8 HOURS
Status: DISCONTINUED | OUTPATIENT
Start: 2024-09-01 | End: 2024-09-03

## 2024-09-01 RX ORDER — METHOCARBAMOL 500 MG/1
500 TABLET, FILM COATED ORAL EVERY 6 HOURS PRN
Status: DISCONTINUED | OUTPATIENT
Start: 2024-09-01 | End: 2024-09-04 | Stop reason: HOSPADM

## 2024-09-01 RX ORDER — MORPHINE SULFATE 2 MG/ML
2 INJECTION, SOLUTION INTRAMUSCULAR; INTRAVENOUS ONCE
Status: COMPLETED | OUTPATIENT
Start: 2024-09-01 | End: 2024-09-01

## 2024-09-01 RX ORDER — METHOCARBAMOL 100 MG/ML
1000 INJECTION, SOLUTION INTRAMUSCULAR; INTRAVENOUS ONCE
Status: COMPLETED | OUTPATIENT
Start: 2024-09-01 | End: 2024-09-01

## 2024-09-01 RX ADMIN — CEPHALEXIN 500 MG: 500 CAPSULE ORAL at 06:51

## 2024-09-01 RX ADMIN — METRONIDAZOLE 500 MG: 500 INJECTION, SOLUTION INTRAVENOUS at 17:15

## 2024-09-01 RX ADMIN — CEPHALEXIN 500 MG: 500 CAPSULE ORAL at 14:00

## 2024-09-01 RX ADMIN — PROMETHAZINE HYDROCHLORIDE 12.5 MG: 25 INJECTION INTRAMUSCULAR; INTRAVENOUS at 19:44

## 2024-09-01 RX ADMIN — MORPHINE SULFATE 4 MG: 4 INJECTION, SOLUTION INTRAMUSCULAR; INTRAVENOUS at 14:08

## 2024-09-01 RX ADMIN — CEPHALEXIN 500 MG: 500 CAPSULE ORAL at 20:20

## 2024-09-01 RX ADMIN — METRONIDAZOLE 500 MG: 500 TABLET ORAL at 14:00

## 2024-09-01 RX ADMIN — VENLAFAXINE HYDROCHLORIDE 37.5 MG: 150 CAPSULE, EXTENDED RELEASE ORAL at 20:20

## 2024-09-01 RX ADMIN — METRONIDAZOLE 500 MG: 500 TABLET ORAL at 10:01

## 2024-09-01 RX ADMIN — PANTOPRAZOLE SODIUM 40 MG: 40 TABLET, DELAYED RELEASE ORAL at 06:51

## 2024-09-01 RX ADMIN — CEPHALEXIN 500 MG: 500 CAPSULE ORAL at 17:04

## 2024-09-01 RX ADMIN — HEPARIN SODIUM 1600 UNITS/HR: 10000 INJECTION, SOLUTION INTRAVENOUS at 00:11

## 2024-09-01 RX ADMIN — PROMETHAZINE HYDROCHLORIDE 12.5 MG: 25 INJECTION INTRAMUSCULAR; INTRAVENOUS at 02:09

## 2024-09-01 RX ADMIN — METHOCARBAMOL 1000 MG: 100 INJECTION INTRAMUSCULAR; INTRAVENOUS at 02:06

## 2024-09-01 RX ADMIN — LAMOTRIGINE 100 MG: 100 TABLET ORAL at 20:20

## 2024-09-01 RX ADMIN — HEPARIN SODIUM 16 UNITS/HR: 10000 INJECTION, SOLUTION INTRAVENOUS at 16:08

## 2024-09-01 RX ADMIN — MORPHINE SULFATE 4 MG: 4 INJECTION, SOLUTION INTRAMUSCULAR; INTRAVENOUS at 20:25

## 2024-09-01 RX ADMIN — MORPHINE SULFATE 2 MG: 2 INJECTION, SOLUTION INTRAMUSCULAR; INTRAVENOUS at 00:54

## 2024-09-01 RX ADMIN — Medication 3 MG: at 20:20

## 2024-09-01 RX ADMIN — ONDANSETRON 4 MG: 2 INJECTION INTRAMUSCULAR; INTRAVENOUS at 14:00

## 2024-09-01 RX ADMIN — VENLAFAXINE HYDROCHLORIDE 150 MG: 150 CAPSULE, EXTENDED RELEASE ORAL at 20:19

## 2024-09-01 ASSESSMENT — PAIN DESCRIPTION - LOCATION
LOCATION: ABDOMEN
LOCATION: ABDOMEN

## 2024-09-01 ASSESSMENT — PAIN - FUNCTIONAL ASSESSMENT
PAIN_FUNCTIONAL_ASSESSMENT: 0-10
PAIN_FUNCTIONAL_ASSESSMENT: 0-10

## 2024-09-01 ASSESSMENT — PAIN SCALES - GENERAL
PAINLEVEL_OUTOF10: 5 - MODERATE PAIN
PAINLEVEL_OUTOF10: 7
PAINLEVEL_OUTOF10: 9
PAINLEVEL_OUTOF10: 8

## 2024-09-01 NOTE — CARE PLAN
The patient's goals for the shift include      The clinical goals for the shift include Remain pain free throughout this shift.      Problem: Skin  Goal: Decreased wound size/increased tissue granulation at next dressing change  Outcome: Progressing  Goal: Participates in plan/prevention/treatment measures  Outcome: Progressing  Goal: Prevent/manage excess moisture  Outcome: Progressing  Goal: Prevent/minimize sheer/friction injuries  Outcome: Progressing  Flowsheets (Taken 9/1/2024 0033)  Prevent/minimize sheer/friction injuries:   HOB 30 degrees or less   Use pull sheet  Goal: Promote/optimize nutrition  Outcome: Progressing  Goal: Promote skin healing  Outcome: Progressing     Problem: Pain - Adult  Goal: Verbalizes/displays adequate comfort level or baseline comfort level  Outcome: Progressing     Problem: Safety - Adult  Goal: Free from fall injury  Outcome: Progressing     Problem: Discharge Planning  Goal: Discharge to home or other facility with appropriate resources  Outcome: Progressing     Problem: Chronic Conditions and Co-morbidities  Goal: Patient's chronic conditions and co-morbidity symptoms are monitored and maintained or improved  Outcome: Progressing

## 2024-09-01 NOTE — CARE PLAN
The patient's goals for the shift include      The clinical goals for the shift include Pain will remain managed throughout this shift.

## 2024-09-01 NOTE — PROGRESS NOTES
Olga Mendes  [id number]   Service: Internal Medicine / Hospitalist Date of service:9/1/2024                          Full Code          Subjective          Review of Systems:   Review of system otherwise negative if not aforementioned above in subjective.  History Obtained From: Patient  Collateral History: Chart review     History Of Present Illness:  Olga Art is a 54 y.o. female with PMHx s/f large bowel obstruction s/p daphne procedure (pathology negative for malignancy), depression, bipolar 1 disorder, anemia presenting with abdominal pain. Pt had hx of daphne procedure, received end colostomy reversal, small bowel resection with anastomosis, lysis of adhesions and flexible sigmodioscopy on 8/13/2024 then hospitalized on 8/23/2024-8/28/2024 for sepsis with concern of post-op infection. She was found to be anemic and received blood transfusion. Negative CTAP findings and was discharged with 10 days of Keflex and Flagyl. She states her abdominal pain started last night, is constant and diffuse throughout.  Has associated nausea and vomiting.  She has only finished a day of her antibiotics, couldn't keep food/meds down with emesis.  Does not feel like her prior bowel obstruction.  She notes her bowel movements are slowly returning to normal function after the procedure and states to be still having loose bowel movements. Denies fever chills, lightheadedness, dizziness, chest pain, shortness of breath, changes in urinary symptoms, leg swelling, syncope.     ED Course (Summary):   Vitals on presentation: 98.4F, 108 bpm, 20 RR, 187/127, 97% on RA  Labs: CMP largely unremarkable  BNP 30, lipase less than 3, troponin negative  Coagulation-INR/PT 1.1/12.9  CBC-WBC 7.4, hemoglobin 10.2, neutrophils 5.45, platelets 709  UA negative  Imaging: CXR-No consolidations, sizeable effusions or pneumothorax. Tortuous and possibly ectatic aorta.  CT AP-Postoperative changes related to previous bowel surgery as above. No  bowel obstruction or free air. Questionable acute pulmonary embolic disease within visualized right lung base.  CTA for PE-Right-sided pulmonary emboli. No CT signs of right heart strain.   Interventions: Zofran, morphine 4 mg, LR 1 L, heparin bolus and drip, Rocephin, Flagyl, admission for further management       9/1...............Patient requesting a regular diet.  No reported: Hematuria, epistaxis, melena or hematochezia.Patient admits to abdominal discomfort after oral antibiotics.      Objective      Physical Exam     Constitutional:       Appearance: Patient appeared in no acute cardiopulmonary distress.     Comments: Patient alert and oriented to person place time and situation.  HEENT:      Head: Normocephalic and atraumatic.Trachea midline      Nose:No observed congestion or rhinorrhea.     Mouth/Throat: Mucous membranes Moist, Trachea appeared  midline.  Eyes:      Extraocular Movements: Extraocular movements intact.      Pupils: Pupils are equal, round, and reactive to light.      Comments: No scleral icterus or conjunctival injection appreciated.   Cardiovascular:      Rate and Rhythm: Normal rate and regular rhythm. No clicks rubs or gallops, normal S1 and S2.No peripheral stigmata of endocarditis appreciated.     Pulmonary:      Lungs appeared clear to auscultation, no adventitious sound appreciated.  Abdominal:      General: Abdomen soft, nontender, active bowel sounds, no involuntary guarding or rebound tenderness appreciated.     Comments: None   Musculoskeletal:       Patient appeared to have full active range of motion for upper and lower extremities, no acute apparent joint deformity appreciated on examination.   No pitting edema or cyanosis appreciated.       Lymphadenopathy:      No appreciable palpable lymphadenopathy  Skin:     General: Skin is warm.      Coloration:  No jaundice     Findings: No abnormal appearing skin rashes or lesions that appeared acute noted on unclothed area of the  skin..   Neurological:      General: No focal sensory or motor deficits appreciated, no meningeal signs or dysmetria noted.      Cranial Nerves: Cranial nerves II to XII appearing grossly intact.     Genitals:  Deferred  Psychiatric:         The patient appears to be displaying normal mood and affect at the time of evaluation.    Labs:     Lab Results   Component Value Date    GLUCOSE 125 (H) 09/01/2024    CALCIUM 8.9 09/01/2024     09/01/2024    K 4.0 09/01/2024    CO2 26 09/01/2024     09/01/2024    BUN 8 09/01/2024    CREATININE 0.67 09/01/2024      Lab Results   Component Value Date    WBC 7.5 09/01/2024    HGB 9.1 (L) 09/01/2024    HCT 29.8 (L) 09/01/2024    MCV 93 09/01/2024     (H) 09/01/2024      [unfilled]   [unfilled]   Susceptibility data from last 90 days.  Collected Specimen Info Organism Ampicillin Ciprofloxacin Levofloxacin Nitrofurantoin Penicillin Trimethoprim/Sulfamethoxazole Vancomycin   08/23/24 Urine from Clean Catch/Voided Enterococcus faecalis  S  I  S  S  S  R  S               X-rays/ Images    [unfilled]   Radiology Results (last 21 days)    Procedure Component Value Units Date/Time   CT angio chest for pulmonary embolism [[id number]] Collected: 08/31/24 1315   Order Status: Completed Updated: 08/31/24 1317   Narrative:     Interpreted By:  Keegan Solo,  STUDY:  CT ANGIO CHEST FOR PULMONARY EMBOLISM;  8/31/2024 12:45 pm      INDICATION:  Signs/Symptoms:Possible PE seen on CT.      COMPARISON:  CT abdomen and pelvis from same day.      ACCESSION NUMBER(S):  [id number]      ORDERING CLINICIAN:  ERIC LEIVA      TECHNIQUE:  Helical data acquisition of the chest was obtained with IV contrast  material.  Images were reformatted in axial, coronal, and sagittal  planes.      FINDINGS:  Lungs and Pleura: Bibasilar and lingular atelectasis. No pulmonary  consolidation. No pleural effusion. No pneumothorax.      Mediastinum and axilla: Right lower lobe and right  upper lobe  pulmonary emboli extending is proximally to the lobar branch point.  No pulmonary artery enlargement or right ventricular enlargement to  indicate right heart strain. No adenopathy by CT size criteria. No  cardiomegaly or pericardial effusion. No thoracic aortic aneurysm.  Atherosclerosis. Advanced coronary artery calcifications.      Visualized Upper Abdomen: Refer to dedicated CT abdomen and pelvis  from same day for details.      MSK/Chest Wall: No aggressive bony lesion identified. Multilevel  degenerative changes in the spine.       Impression:     Right-sided pulmonary emboli. No CT signs of right heart strain.          Keegan Solo sent epic message to  ERIC LEIVA on 8/31/2024 at  1:08 pm.  (**-RCF-**) Findings:  See findings.              Signed by: Keegan Solo 8/31/2024 1:14 PM  Dictation workstation:   [id number]   CT abdomen pelvis w IV contrast [[id number]] Collected: 08/31/24 1147   Order Status: Completed Updated: 08/31/24 1147   Narrative:     Interpreted By:  Srikanth Andres,  STUDY:  CT ABDOMEN PELVIS W IV CONTRAST;  8/31/2024 11:11 am      INDICATION:  Signs/Symptoms:History of bowel obstruction status post colostomy  with reversal complicated by recent infection, still on antibiotics.  1 day of diffuse abdominal pain, lack of bowel movement, and  nausea/vomiting..      COMPARISON:  None.      ACCESSION NUMBER(S):  [id number]      ORDERING CLINICIAN:  ERIC LEIVA      TECHNIQUE:  CT of the abdomen and pelvis was performed.  Standard contiguous  axial images were obtained at 3 mm slice thickness through the  abdomen and pelvis. Coronal and sagittal reconstructions at 3 mm  slice thickness were performed.      75  ml of contrast Omnipaque 350 were administered intravenously  without immediate complication.      FINDINGS:  LOWER CHEST:  Questionable incidental filling defect within the right lower lobe  pulmonary artery branches concerning for acute aortic pulmonary  embolic  disease. Evaluation is limited. Minimal lingular atelectasis,  scarring or mild infiltrate, incompletely included in field of view.  The remainder of the visualized lung bases otherwise clear.      ABDOMEN:      LIVER:  Liver is normal in size. No focal lesions are seen.      BILE DUCTS:  Biliary system is nondilated.      GALLBLADDER:  The gallbladder has been surgically removed.      PANCREAS:  No focal lesions. No peripancreatic fat stranding.      SPLEEN:  The spleen is normal in size. No focal abnormalities are seen.      ADRENAL GLANDS:  The adrenal glands bilaterally within normal limits.      KIDNEYS AND URETERS:  No hydronephrosis or renal masses. No perinephric stranding. No  radiodense renal calculi.      PELVIS:      BLADDER:  Within normal limits as distended. No bladder calculi or masses.      REPRODUCTIVE ORGANS:  The uterus is not seen and may have been surgically removed. No  pelvic free fluid, masses or lymphadenopathy      BOWEL:  Surgical anastomotic sutures in the rectosigmoid and central lower  abdominal small bowel. Small and large bowel are nondilated. Surgical  scars within the midline ventral abdominal wall and to the left of  midline related to previous colostomy site. The appendix is not  positively identified, however, no findings to suggest acute  appendicitis is seen. No mesenteric edema or lymphadenopathy. No  fluid collections.      VESSELS:  Mild wall calcification of the aorta. No aneurysm or dissection.      PERITONEUM/RETROPERITONEUM/LYMPH NODES:  No retroperitoneal masses are seen.  No lymphadenopathy.      BONES AND ABDOMINAL WALL:  There is no evidence for destructive lytic or blastic bone lesions  identified.  Mild multilevel discogenic degenerative changes.       Impression:     1.  Postoperative changes related to previous bowel surgery as above.  No bowel obstruction or free air.  2. Questionable acute pulmonary embolic disease within visualized  right lung base. Study not  optimized for evaluation of pulmonary  artery branches. Clinical correlation recommended. Further evaluation  with chest CT can be performed for better assessment as clinically  warranted.  3. Additional detailed findings as above.      SUPPLEMENTAL INFORMATION:  Notifi message was left for ERIC LEIVA regarding this exam by Dr. Andres on 8/31/2024 at approximately 11:43 hours. Critical Finding:  See findings. Notification was initiated on 8/31/2024 at 11:43 am by  Srikanth Andres.  (**-YCF-**) Instructions:      Signed by: Srikanth Andres 8/31/2024 11:46 AM  Dictation workstation:   [id number]   XR chest 1 view [[id number]] Collected: 08/31/24 1113   Order Status: Completed Updated: 08/31/24 1113   Narrative:     Interpreted By:  Srikanth Andres,  STUDY:  XR CHEST 1 VIEW;; 8/31/2024 10:40 am      INDICATION:  Signs/Symptoms:abdominal pain.      COMPARISON:  CT abdomen from 08/23/2024      ACCESSION NUMBER(S):  [id number]      ORDERING CLINICIAN:  ERIC LEIVA      FINDINGS:  Tortuous and possibly ectatic aorta. Evaluation limited to portable  technique and positioning. Dissection can not be excluded in the  basis of this exam. Left basilar atelectasis or scarring. No  consolidations, sizeable effusions or pneumothorax. Degenerative  changes of bilateral AC joints.       Impression:     1. No consolidations, sizeable effusions or pneumothorax.      2. Tortuous and possibly ectatic aorta. Acute aortic pathology not  excluded in the basis of this exam only. If such clinical concern  persists, further evaluation with chest CT should be considered for  better assessment.              Signed by: Srikanth Andres 8/31/2024 11:12 AM  Dictation workstation:   [id number]             Medical Problems       Problem List       * (Principal) Pulmonary embolism without acute cor pulmonale, unspecified chronicity, unspecified pulmonary embolism type (Multi)    Bipolar 1 disorder (Multi)    Complete intestinal obstruction (Multi)     Depressive disorder    History of colostomy reversal    Obstruction of colon (Multi)    Peptic ulcer with hemorrhage and perforation and with obstruction (Multi)    Severe episode of recurrent major depressive disorder (Multi)    Overview Signed 8/31/2024  2:14 PM by Cely Broussard PA-C     ICD-10 utility update                    Above medical problems may be reflective of historical medical problems that may have resolved and may not related to acute clinical condition/medical problems.    Clinical impression/plan:      54 y.o. female with PMHx s/f large bowel obstruction s/p daphne procedure (pathology negative for malignancy), depression, anemia presenting with abdominal pain.      Pulmonary embolism without acute cor pulmonale  -presumed provoked 2/2 to recent abdominal surgery and periods of immobilization  -Vascular US LE bilateral pending  -Received heparin bolus, continue on heparin drip     Abdominal pain  -CT AP-Postoperative changes related to previous bowel surgery as above. No bowel obstruction or free air.   -no fever, normal WBC  -Continue on p.o. Keflex and Flagyl     Anemia  -Hgb 10.2, stable and platelets improving at 709 compared to 523 from last hospitalization on 8/22/2024  -Continue to trend while admitted and transfuse as necessary     Depression  -Continue on home venlafaxine     Bipolar 1 disorder  -Continue on lamotrigine     Diet: Cardiac  DVT Prophylaxis: Heparin  Code Status: Full code       Disposition/additional care plan/interventions: 9/1/2024    Continue heparin drip    Monitor clinical signs of bleeding    Monitor electrolytes    Nutritionist consult    Discussion at bedside later today with patient  concerning care plan, patient's  in agreement with care plan.    Suspect provoked VTE.  Formal consult to hematology oncology concerning additional workup.  If okay with hematology oncology anticipate transition to oral anticoagulation/NOAC in the next 24 hours.    Await  ultrasound of lower extremities bilaterally    Clinical suspicion for likely iatrogenic related abdominal discomfort related to oral Flagyl, transition to IV therapy and monitor clinical response.    Monitor H&H/CBC.              The patient was informed of differential diagnosis , work up , plan of care and possible sequelae of clinical disposition.Patient in agreement with plan of care. Further recommendations forthcoming in accordance with patient's clinical disposition and response to care.    Discharge planning: Anticipate likely discharge in next 24 hours    Care time:>55 mins           Dictation performed with assistance of voice recognition device therefore transcription errors are possible.

## 2024-09-02 LAB
ANION GAP SERPL CALC-SCNC: 11 MMOL/L (ref 10–20)
BUN SERPL-MCNC: 9 MG/DL (ref 6–23)
CALCIUM SERPL-MCNC: 9 MG/DL (ref 8.6–10.3)
CHLORIDE SERPL-SCNC: 102 MMOL/L (ref 98–107)
CO2 SERPL-SCNC: 28 MMOL/L (ref 21–32)
CREAT SERPL-MCNC: 0.73 MG/DL (ref 0.5–1.05)
EGFRCR SERPLBLD CKD-EPI 2021: >90 ML/MIN/1.73M*2
ERYTHROCYTE [DISTWIDTH] IN BLOOD BY AUTOMATED COUNT: 15.3 % (ref 11.5–14.5)
GLUCOSE SERPL-MCNC: 102 MG/DL (ref 74–99)
HCT VFR BLD AUTO: 29.2 % (ref 36–46)
HGB BLD-MCNC: 8.9 G/DL (ref 12–16)
MAGNESIUM SERPL-MCNC: 1.88 MG/DL (ref 1.6–2.4)
MCH RBC QN AUTO: 28.1 PG (ref 26–34)
MCHC RBC AUTO-ENTMCNC: 30.5 G/DL (ref 32–36)
MCV RBC AUTO: 92 FL (ref 80–100)
NRBC BLD-RTO: 0 /100 WBCS (ref 0–0)
PLATELET # BLD AUTO: 554 X10*3/UL (ref 150–450)
POTASSIUM SERPL-SCNC: 3.9 MMOL/L (ref 3.5–5.3)
RBC # BLD AUTO: 3.17 X10*6/UL (ref 4–5.2)
SODIUM SERPL-SCNC: 137 MMOL/L (ref 136–145)
UFH PPP CHRO-ACNC: 0.7 IU/ML
WBC # BLD AUTO: 6.3 X10*3/UL (ref 4.4–11.3)

## 2024-09-02 PROCEDURE — 2500000004 HC RX 250 GENERAL PHARMACY W/ HCPCS (ALT 636 FOR OP/ED): Mod: JZ | Performed by: HOSPITALIST

## 2024-09-02 PROCEDURE — 85520 HEPARIN ASSAY: CPT | Performed by: EMERGENCY MEDICINE

## 2024-09-02 PROCEDURE — 2500000004 HC RX 250 GENERAL PHARMACY W/ HCPCS (ALT 636 FOR OP/ED)

## 2024-09-02 PROCEDURE — 83735 ASSAY OF MAGNESIUM: CPT | Performed by: HOSPITALIST

## 2024-09-02 PROCEDURE — 1200000002 HC GENERAL ROOM WITH TELEMETRY DAILY

## 2024-09-02 PROCEDURE — 2500000001 HC RX 250 WO HCPCS SELF ADMINISTERED DRUGS (ALT 637 FOR MEDICARE OP)

## 2024-09-02 PROCEDURE — 36415 COLL VENOUS BLD VENIPUNCTURE: CPT | Performed by: EMERGENCY MEDICINE

## 2024-09-02 PROCEDURE — 2500000001 HC RX 250 WO HCPCS SELF ADMINISTERED DRUGS (ALT 637 FOR MEDICARE OP): Performed by: STUDENT IN AN ORGANIZED HEALTH CARE EDUCATION/TRAINING PROGRAM

## 2024-09-02 PROCEDURE — 99233 SBSQ HOSP IP/OBS HIGH 50: CPT | Performed by: HOSPITALIST

## 2024-09-02 PROCEDURE — 2500000004 HC RX 250 GENERAL PHARMACY W/ HCPCS (ALT 636 FOR OP/ED): Performed by: EMERGENCY MEDICINE

## 2024-09-02 PROCEDURE — 85027 COMPLETE CBC AUTOMATED: CPT | Performed by: EMERGENCY MEDICINE

## 2024-09-02 PROCEDURE — 2500000001 HC RX 250 WO HCPCS SELF ADMINISTERED DRUGS (ALT 637 FOR MEDICARE OP): Performed by: HOSPITALIST

## 2024-09-02 PROCEDURE — 80048 BASIC METABOLIC PNL TOTAL CA: CPT | Performed by: HOSPITALIST

## 2024-09-02 RX ORDER — DOCUSATE SODIUM 100 MG/1
100 CAPSULE, LIQUID FILLED ORAL 2 TIMES DAILY
Status: DISCONTINUED | OUTPATIENT
Start: 2024-09-02 | End: 2024-09-04 | Stop reason: HOSPADM

## 2024-09-02 RX ORDER — LAMOTRIGINE 25 MG/1
25 TABLET ORAL DAILY
Status: DISCONTINUED | OUTPATIENT
Start: 2024-09-02 | End: 2024-09-04 | Stop reason: HOSPADM

## 2024-09-02 RX ORDER — SYRING-NEEDL,DISP,INSUL,0.3 ML 29 G X1/2"
148 SYRINGE, EMPTY DISPOSABLE MISCELLANEOUS ONCE
Status: DISCONTINUED | OUTPATIENT
Start: 2024-09-02 | End: 2024-09-03

## 2024-09-02 RX ADMIN — HEPARIN SODIUM 1800 UNITS/HR: 10000 INJECTION, SOLUTION INTRAVENOUS at 09:06

## 2024-09-02 RX ADMIN — Medication 3 MG: at 22:52

## 2024-09-02 RX ADMIN — CEPHALEXIN 500 MG: 500 CAPSULE ORAL at 20:54

## 2024-09-02 RX ADMIN — VENLAFAXINE HYDROCHLORIDE 37.5 MG: 150 CAPSULE, EXTENDED RELEASE ORAL at 20:54

## 2024-09-02 RX ADMIN — METRONIDAZOLE 500 MG: 500 INJECTION, SOLUTION INTRAVENOUS at 01:27

## 2024-09-02 RX ADMIN — POLYETHYLENE GLYCOL 3350 17 G: 17 POWDER, FOR SOLUTION ORAL at 09:14

## 2024-09-02 RX ADMIN — CEPHALEXIN 500 MG: 500 CAPSULE ORAL at 17:46

## 2024-09-02 RX ADMIN — PANTOPRAZOLE SODIUM 40 MG: 40 TABLET, DELAYED RELEASE ORAL at 06:07

## 2024-09-02 RX ADMIN — METRONIDAZOLE 500 MG: 500 INJECTION, SOLUTION INTRAVENOUS at 17:46

## 2024-09-02 RX ADMIN — CEPHALEXIN 500 MG: 500 CAPSULE ORAL at 12:23

## 2024-09-02 RX ADMIN — DOCUSATE SODIUM 100 MG: 100 CAPSULE, LIQUID FILLED ORAL at 20:54

## 2024-09-02 RX ADMIN — METRONIDAZOLE 500 MG: 500 INJECTION, SOLUTION INTRAVENOUS at 09:06

## 2024-09-02 RX ADMIN — ONDANSETRON 4 MG: 2 INJECTION INTRAMUSCULAR; INTRAVENOUS at 17:46

## 2024-09-02 RX ADMIN — CEPHALEXIN 500 MG: 500 CAPSULE ORAL at 06:07

## 2024-09-02 RX ADMIN — ACETAMINOPHEN 650 MG: 325 TABLET ORAL at 10:19

## 2024-09-02 RX ADMIN — VENLAFAXINE HYDROCHLORIDE 150 MG: 150 CAPSULE, EXTENDED RELEASE ORAL at 20:55

## 2024-09-02 RX ADMIN — LAMOTRIGINE 25 MG: 25 TABLET ORAL at 21:07

## 2024-09-02 RX ADMIN — ONDANSETRON 4 MG: 2 INJECTION INTRAMUSCULAR; INTRAVENOUS at 10:18

## 2024-09-02 ASSESSMENT — COGNITIVE AND FUNCTIONAL STATUS - GENERAL
DAILY ACTIVITIY SCORE: 24
MOBILITY SCORE: 24

## 2024-09-02 ASSESSMENT — PAIN DESCRIPTION - LOCATION: LOCATION: HEAD

## 2024-09-02 ASSESSMENT — PAIN SCALES - GENERAL
PAINLEVEL_OUTOF10: 0 - NO PAIN
PAINLEVEL_OUTOF10: 1

## 2024-09-02 ASSESSMENT — PAIN - FUNCTIONAL ASSESSMENT: PAIN_FUNCTIONAL_ASSESSMENT: 0-10

## 2024-09-02 NOTE — PROGRESS NOTES
Olga Mendes  [id number]   Service: Internal Medicine / Hospitalist Date of service:9/1/2024                                  Full Code            Subjective              Review of Systems:   Review of system otherwise negative if not aforementioned above in subjective.  History Obtained From: Patient  Collateral History: Chart review     History Of Present Illness:  Olga Art is a 54 y.o. female with PMHx s/f large bowel obstruction s/p daphne procedure (pathology negative for malignancy), depression, bipolar 1 disorder, anemia presenting with abdominal pain. Pt had hx of daphne procedure, received end colostomy reversal, small bowel resection with anastomosis, lysis of adhesions and flexible sigmodioscopy on 8/13/2024 then hospitalized on 8/23/2024-8/28/2024 for sepsis with concern of post-op infection. She was found to be anemic and received blood transfusion. Negative CTAP findings and was discharged with 10 days of Keflex and Flagyl. She states her abdominal pain started last night, is constant and diffuse throughout.  Has associated nausea and vomiting.  She has only finished a day of her antibiotics, couldn't keep food/meds down with emesis.  Does not feel like her prior bowel obstruction.  She notes her bowel movements are slowly returning to normal function after the procedure and states to be still having loose bowel movements. Denies fever chills, lightheadedness, dizziness, chest pain, shortness of breath, changes in urinary symptoms, leg swelling, syncope.     ED Course (Summary):   Vitals on presentation: 98.4F, 108 bpm, 20 RR, 187/127, 97% on RA  Labs: CMP largely unremarkable  BNP 30, lipase less than 3, troponin negative  Coagulation-INR/PT 1.1/12.9  CBC-WBC 7.4, hemoglobin 10.2, neutrophils 5.45, platelets 709  UA negative  Imaging: CXR-No consolidations, sizeable effusions or pneumothorax. Tortuous and possibly ectatic aorta.  CT AP-Postoperative changes related to previous bowel surgery  as above. No bowel obstruction or free air. Questionable acute pulmonary embolic disease within visualized right lung base.  CTA for PE-Right-sided pulmonary emboli. No CT signs of right heart strain.   Interventions: Zofran, morphine 4 mg, LR 1 L, heparin bolus and drip, Rocephin, Flagyl, admission for further management        9/1...............Patient requesting a regular diet.  No reported: Hematuria, epistaxis, melena or hematochezia.Patient admits to abdominal discomfort after oral antibiotics.       9/2...............Patient endorsed constipation denies acute abdominal pain.  Disclose overall generalized malaise.  Patient has been reported patient is usually very up beat.        Objective        Physical Exam      Constitutional:       Appearance: Patient appeared in no acute cardiopulmonary distress.     Comments: Patient alert and oriented to person place time and situation.  HEENT:      Head: Normocephalic and atraumatic.Trachea midline      Nose:No observed congestion or rhinorrhea.     Mouth/Throat: Mucous membranes Moist, Trachea appeared  midline.  Eyes:      Extraocular Movements: Extraocular movements intact.      Pupils: Pupils are equal, round, and reactive to light.      Comments: No scleral icterus or conjunctival injection appreciated.   Cardiovascular:      Rate and Rhythm: Normal rate and regular rhythm. No clicks rubs or gallops, normal S1 and S2.No peripheral stigmata of endocarditis appreciated.     Pulmonary:      Lungs appeared clear to auscultation, no adventitious sound appreciated.  Abdominal:      General: Abdomen soft, nontender, active bowel sounds, no involuntary guarding or rebound tenderness appreciated.     Comments: None   Musculoskeletal:       Patient appeared to have full active range of motion for upper and lower extremities, no acute apparent joint deformity appreciated on examination.   No pitting edema or cyanosis appreciated.       Lymphadenopathy:      No appreciable  palpable lymphadenopathy  Skin:     General: Skin is warm.      Coloration:  No jaundice     Findings: No abnormal appearing skin rashes or lesions that appeared acute noted on unclothed area of the skin..   Neurological:      General: No focal sensory or motor deficits appreciated, no meningeal signs or dysmetria noted.      Cranial Nerves: Cranial nerves II to XII appearing grossly intact.     Genitals:  Deferred  Psychiatric:         The patient appears to be displaying normal mood and affect at the time of evaluation.     Labs:           Lab Results   Component Value Date     GLUCOSE 125 (H) 09/01/2024     CALCIUM 8.9 09/01/2024      09/01/2024     K 4.0 09/01/2024     CO2 26 09/01/2024      09/01/2024     BUN 8 09/01/2024     CREATININE 0.67 09/01/2024            Lab Results   Component Value Date     WBC 7.5 09/01/2024     HGB 9.1 (L) 09/01/2024     HCT 29.8 (L) 09/01/2024     MCV 93 09/01/2024      (H) 09/01/2024      [unfilled]   [unfilled]   Susceptibility data from last 90 days.  Collected Specimen Info Organism Ampicillin Ciprofloxacin Levofloxacin Nitrofurantoin Penicillin Trimethoprim/Sulfamethoxazole Vancomycin   08/23/24 Urine from Clean Catch/Voided Enterococcus faecalis  S  I  S  S  S  R  S                   X-rays/ Images     [unfilled]   Radiology Results (last 21 days)     Procedure Component Value Units Date/Time   CT angio chest for pulmonary embolism [[id number]] Collected: 08/31/24 1315   Order Status: Completed Updated: 08/31/24 1317   Narrative:     Interpreted By:  Keegan Solo,  STUDY:  CT ANGIO CHEST FOR PULMONARY EMBOLISM;  8/31/2024 12:45 pm      INDICATION:  Signs/Symptoms:Possible PE seen on CT.      COMPARISON:  CT abdomen and pelvis from same day.      ACCESSION NUMBER(S):  [id number]      ORDERING CLINICIAN:  ERIC LEIVA      TECHNIQUE:  Helical data acquisition of the chest was obtained with IV contrast  material.  Images were reformatted in  axial, coronal, and sagittal  planes.      FINDINGS:  Lungs and Pleura: Bibasilar and lingular atelectasis. No pulmonary  consolidation. No pleural effusion. No pneumothorax.      Mediastinum and axilla: Right lower lobe and right upper lobe  pulmonary emboli extending is proximally to the lobar branch point.  No pulmonary artery enlargement or right ventricular enlargement to  indicate right heart strain. No adenopathy by CT size criteria. No  cardiomegaly or pericardial effusion. No thoracic aortic aneurysm.  Atherosclerosis. Advanced coronary artery calcifications.      Visualized Upper Abdomen: Refer to dedicated CT abdomen and pelvis  from same day for details.      MSK/Chest Wall: No aggressive bony lesion identified. Multilevel  degenerative changes in the spine.       Impression:     Right-sided pulmonary emboli. No CT signs of right heart strain.          Keegan Solo sent epic message to  ERIC LEIVA on 8/31/2024 at  1:08 pm.  (**-RCF-**) Findings:  See findings.              Signed by: Keegan Solo 8/31/2024 1:14 PM  Dictation workstation:   [id number]   CT abdomen pelvis w IV contrast [[id number]] Collected: 08/31/24 1147   Order Status: Completed Updated: 08/31/24 1147   Narrative:     Interpreted By:  Srikanth Andres,  STUDY:  CT ABDOMEN PELVIS W IV CONTRAST;  8/31/2024 11:11 am      INDICATION:  Signs/Symptoms:History of bowel obstruction status post colostomy  with reversal complicated by recent infection, still on antibiotics.  1 day of diffuse abdominal pain, lack of bowel movement, and  nausea/vomiting..      COMPARISON:  None.      ACCESSION NUMBER(S):  [id number]      ORDERING CLINICIAN:  ERIC LEIVA      TECHNIQUE:  CT of the abdomen and pelvis was performed.  Standard contiguous  axial images were obtained at 3 mm slice thickness through the  abdomen and pelvis. Coronal and sagittal reconstructions at 3 mm  slice thickness were performed.      75  ml of contrast Omnipaque 350 were  administered intravenously  without immediate complication.      FINDINGS:  LOWER CHEST:  Questionable incidental filling defect within the right lower lobe  pulmonary artery branches concerning for acute aortic pulmonary  embolic disease. Evaluation is limited. Minimal lingular atelectasis,  scarring or mild infiltrate, incompletely included in field of view.  The remainder of the visualized lung bases otherwise clear.      ABDOMEN:      LIVER:  Liver is normal in size. No focal lesions are seen.      BILE DUCTS:  Biliary system is nondilated.      GALLBLADDER:  The gallbladder has been surgically removed.      PANCREAS:  No focal lesions. No peripancreatic fat stranding.      SPLEEN:  The spleen is normal in size. No focal abnormalities are seen.      ADRENAL GLANDS:  The adrenal glands bilaterally within normal limits.      KIDNEYS AND URETERS:  No hydronephrosis or renal masses. No perinephric stranding. No  radiodense renal calculi.      PELVIS:      BLADDER:  Within normal limits as distended. No bladder calculi or masses.      REPRODUCTIVE ORGANS:  The uterus is not seen and may have been surgically removed. No  pelvic free fluid, masses or lymphadenopathy      BOWEL:  Surgical anastomotic sutures in the rectosigmoid and central lower  abdominal small bowel. Small and large bowel are nondilated. Surgical  scars within the midline ventral abdominal wall and to the left of  midline related to previous colostomy site. The appendix is not  positively identified, however, no findings to suggest acute  appendicitis is seen. No mesenteric edema or lymphadenopathy. No  fluid collections.      VESSELS:  Mild wall calcification of the aorta. No aneurysm or dissection.      PERITONEUM/RETROPERITONEUM/LYMPH NODES:  No retroperitoneal masses are seen.  No lymphadenopathy.      BONES AND ABDOMINAL WALL:  There is no evidence for destructive lytic or blastic bone lesions  identified.  Mild multilevel discogenic degenerative  changes.       Impression:     1.  Postoperative changes related to previous bowel surgery as above.  No bowel obstruction or free air.  2. Questionable acute pulmonary embolic disease within visualized  right lung base. Study not optimized for evaluation of pulmonary  artery branches. Clinical correlation recommended. Further evaluation  with chest CT can be performed for better assessment as clinically  warranted.  3. Additional detailed findings as above.      SUPPLEMENTAL INFORMATION:  Notifi message was left for ERIC LEIVA regarding this exam by Dr. Andres on 8/31/2024 at approximately 11:43 hours. Critical Finding:  See findings. Notification was initiated on 8/31/2024 at 11:43 am by  Srikanth Andres.  (**-YCF-**) Instructions:      Signed by: Srikanth Andres 8/31/2024 11:46 AM  Dictation workstation:   [id number]   XR chest 1 view [[id number]] Collected: 08/31/24 1113   Order Status: Completed Updated: 08/31/24 1113   Narrative:     Interpreted By:  Srikanth Andres,  STUDY:  XR CHEST 1 VIEW;; 8/31/2024 10:40 am      INDICATION:  Signs/Symptoms:abdominal pain.      COMPARISON:  CT abdomen from 08/23/2024      ACCESSION NUMBER(S):  [id number]      ORDERING CLINICIAN:  ERIC LEIVA      FINDINGS:  Tortuous and possibly ectatic aorta. Evaluation limited to portable  technique and positioning. Dissection can not be excluded in the  basis of this exam. Left basilar atelectasis or scarring. No  consolidations, sizeable effusions or pneumothorax. Degenerative  changes of bilateral AC joints.       Impression:     1. No consolidations, sizeable effusions or pneumothorax.      2. Tortuous and possibly ectatic aorta. Acute aortic pathology not  excluded in the basis of this exam only. If such clinical concern  persists, further evaluation with chest CT should be considered for  better assessment.              Signed by: Srikanth Andres 8/31/2024 11:12 AM  Dictation workstation:   [id number]                  Medical Problems          Problem List         * (Principal) Pulmonary embolism without acute cor pulmonale, unspecified chronicity, unspecified pulmonary embolism type (Multi)     Bipolar 1 disorder (Multi)     Complete intestinal obstruction (Multi)     Depressive disorder     History of colostomy reversal     Obstruction of colon (Multi)     Peptic ulcer with hemorrhage and perforation and with obstruction (Multi)     Severe episode of recurrent major depressive disorder (Multi)     Overview Signed 8/31/2024  2:14 PM by Cely Broussard PA-C       ICD-10 utility update                        Above medical problems may be reflective of historical medical problems that may have resolved and may not related to acute clinical condition/medical problems.     Clinical impression/plan:        54 y.o. female with PMHx s/f large bowel obstruction s/p daphne procedure (pathology negative for malignancy), depression, anemia presenting with abdominal pain.      Pulmonary embolism without acute cor pulmonale  -presumed provoked 2/2 to recent abdominal surgery and periods of immobilization  -Vascular US LE bilateral pending  -Received heparin bolus, continue on heparin drip     Abdominal pain  -CT AP-Postoperative changes related to previous bowel surgery as above. No bowel obstruction or free air.   -no fever, normal WBC  -Continue on p.o. Keflex and Flagyl     Anemia  -Hgb 10.2, stable and platelets improving at 709 compared to 523 from last hospitalization on 8/22/2024  -Continue to trend while admitted and transfuse as necessary     Depression  -Continue on home venlafaxine     Bipolar 1 disorder  -Continue on lamotrigine     Diet: Cardiac  DVT Prophylaxis: Heparin  Code Status: Full code        Disposition/additional care plan/interventions: 9/1/2024     Continue heparin drip     Monitor clinical signs of bleeding     Monitor electrolytes     Nutritionist consult     Discussion at bedside later today with patient  concerning care plan,  patient's  in agreement with care plan.     Suspect provoked VTE.  Formal consult to hematology oncology concerning additional workup.  If okay with hematology oncology anticipate transition to oral anticoagulation/NOAC in the next 24 hours.     Await ultrasound of lower extremities bilaterally     Clinical suspicion for likely iatrogenic related abdominal discomfort related to oral Flagyl, transition to IV therapy and monitor clinical response.     Monitor H&H/CBC.            Disposition/additional care plan/interventions: 9/2/2024     Non acute appearing abdominal examination, no rebound tenderness appreciated.  Constipation reported, active bowel sounds, no involuntary guarding appreciated.    CT scan of abdomen on presentation :1.  Postoperative changes related to previous bowel surgery  no bowel obstruction or free air.     Continue monitoring, continue stool softeners,    Continue heparin drip today    Patient reports marked nausea after switching to IV Flagyl.,  Reported penicillin allergy, continue antibiotic therapy.    Continue heparin drip likely transition to oral therapy next 24 hours of.  Patient and  felt that overall patient was discharged too early from her prior hospitalization before presentation here..    Monitor H&H    Incentive spirometer    Physical therapy    Await lower extremity ultrasounds results  for VTE    The patient was informed of differential diagnosis , work up , plan of care and possible sequelae of clinical disposition.Patient in agreement with plan of care. Further recommendations forthcoming in accordance with patient's clinical disposition and response to care.     Discharge planning: Anticipate likely discharge in next 24 hours     Care time:>55 mins              Dictation performed with assistance of voice recognition device therefore transcription errors are possible.

## 2024-09-02 NOTE — CARE PLAN
The clinical goals for the shift include be free from falls and injury      Problem: Skin  Goal: Decreased wound size/increased tissue granulation at next dressing change  Outcome: Progressing  Goal: Promote skin healing  Outcome: Progressing  Flowsheets (Taken 9/2/2024 1012)  Promote skin healing: Assess skin/pad under line(s)/device(s)     Problem: Pain - Adult  Goal: Verbalizes/displays adequate comfort level or baseline comfort level  Outcome: Progressing     Problem: Safety - Adult  Goal: Free from fall injury  Outcome: Progressing

## 2024-09-02 NOTE — CARE PLAN
The patient's goals for the shift include      The clinical goals for the shift include pain management      Problem: Skin  Goal: Decreased wound size/increased tissue granulation at next dressing change  Outcome: Progressing  Goal: Participates in plan/prevention/treatment measures  Outcome: Progressing  Goal: Prevent/manage excess moisture  Outcome: Progressing  Goal: Prevent/minimize sheer/friction injuries  Outcome: Progressing  Goal: Promote/optimize nutrition  Outcome: Progressing  Goal: Promote skin healing  Outcome: Progressing     Problem: Pain - Adult  Goal: Verbalizes/displays adequate comfort level or baseline comfort level  Outcome: Progressing     Problem: Safety - Adult  Goal: Free from fall injury  Outcome: Progressing     Problem: Discharge Planning  Goal: Discharge to home or other facility with appropriate resources  Outcome: Progressing     Problem: Chronic Conditions and Co-morbidities  Goal: Patient's chronic conditions and co-morbidity symptoms are monitored and maintained or improved  Outcome: Progressing

## 2024-09-03 ENCOUNTER — APPOINTMENT (OUTPATIENT)
Dept: VASCULAR MEDICINE | Facility: HOSPITAL | Age: 54
End: 2024-09-03
Payer: MEDICAID

## 2024-09-03 LAB
ANION GAP SERPL CALC-SCNC: 13 MMOL/L (ref 10–20)
ATRIAL RATE: 104 BPM
BUN SERPL-MCNC: 10 MG/DL (ref 6–23)
CALCIUM SERPL-MCNC: 8 MG/DL (ref 8.6–10.3)
CHLORIDE SERPL-SCNC: 104 MMOL/L (ref 98–107)
CO2 SERPL-SCNC: 24 MMOL/L (ref 21–32)
CREAT SERPL-MCNC: 0.76 MG/DL (ref 0.5–1.05)
EGFRCR SERPLBLD CKD-EPI 2021: >90 ML/MIN/1.73M*2
GLUCOSE SERPL-MCNC: 110 MG/DL (ref 74–99)
P AXIS: 36 DEGREES
POTASSIUM SERPL-SCNC: 4 MMOL/L (ref 3.5–5.3)
PR INTERVAL: 131 MS
Q ONSET: 251 MS
QRS COUNT: 17 BEATS
QRS DURATION: 92 MS
QT INTERVAL: 339 MS
QTC CALCULATION(BAZETT): 446 MS
QTC FREDERICIA: 407 MS
R AXIS: 13 DEGREES
SODIUM SERPL-SCNC: 137 MMOL/L (ref 136–145)
T AXIS: 34 DEGREES
T OFFSET: 421 MS
UFH PPP CHRO-ACNC: 0.5 IU/ML
VENTRICULAR RATE: 104 BPM

## 2024-09-03 PROCEDURE — 99232 SBSQ HOSP IP/OBS MODERATE 35: CPT | Performed by: HOSPITALIST

## 2024-09-03 PROCEDURE — 2500000004 HC RX 250 GENERAL PHARMACY W/ HCPCS (ALT 636 FOR OP/ED): Performed by: EMERGENCY MEDICINE

## 2024-09-03 PROCEDURE — 2500000001 HC RX 250 WO HCPCS SELF ADMINISTERED DRUGS (ALT 637 FOR MEDICARE OP): Performed by: HOSPITALIST

## 2024-09-03 PROCEDURE — 99252 IP/OBS CONSLTJ NEW/EST SF 35: CPT | Performed by: INTERNAL MEDICINE

## 2024-09-03 PROCEDURE — 2500000001 HC RX 250 WO HCPCS SELF ADMINISTERED DRUGS (ALT 637 FOR MEDICARE OP)

## 2024-09-03 PROCEDURE — 2500000001 HC RX 250 WO HCPCS SELF ADMINISTERED DRUGS (ALT 637 FOR MEDICARE OP): Performed by: INTERNAL MEDICINE

## 2024-09-03 PROCEDURE — 2500000001 HC RX 250 WO HCPCS SELF ADMINISTERED DRUGS (ALT 637 FOR MEDICARE OP): Performed by: STUDENT IN AN ORGANIZED HEALTH CARE EDUCATION/TRAINING PROGRAM

## 2024-09-03 PROCEDURE — 80048 BASIC METABOLIC PNL TOTAL CA: CPT | Performed by: HOSPITALIST

## 2024-09-03 PROCEDURE — 1200000002 HC GENERAL ROOM WITH TELEMETRY DAILY

## 2024-09-03 PROCEDURE — 85302 CLOT INHIBIT PROT C ANTIGEN: CPT | Performed by: INTERNAL MEDICINE

## 2024-09-03 PROCEDURE — 85520 HEPARIN ASSAY: CPT | Performed by: EMERGENCY MEDICINE

## 2024-09-03 PROCEDURE — 85613 RUSSELL VIPER VENOM DILUTED: CPT | Mod: PORLAB | Performed by: INTERNAL MEDICINE

## 2024-09-03 PROCEDURE — 36415 COLL VENOUS BLD VENIPUNCTURE: CPT | Performed by: HOSPITALIST

## 2024-09-03 PROCEDURE — 2500000004 HC RX 250 GENERAL PHARMACY W/ HCPCS (ALT 636 FOR OP/ED): Performed by: INTERNAL MEDICINE

## 2024-09-03 PROCEDURE — 2500000004 HC RX 250 GENERAL PHARMACY W/ HCPCS (ALT 636 FOR OP/ED): Mod: JZ | Performed by: HOSPITALIST

## 2024-09-03 PROCEDURE — 2500000004 HC RX 250 GENERAL PHARMACY W/ HCPCS (ALT 636 FOR OP/ED)

## 2024-09-03 PROCEDURE — 36415 COLL VENOUS BLD VENIPUNCTURE: CPT | Performed by: EMERGENCY MEDICINE

## 2024-09-03 PROCEDURE — 93970 EXTREMITY STUDY: CPT

## 2024-09-03 PROCEDURE — 93970 EXTREMITY STUDY: CPT | Performed by: SURGERY

## 2024-09-03 RX ORDER — SYRING-NEEDL,DISP,INSUL,0.3 ML 29 G X1/2"
148 SYRINGE, EMPTY DISPOSABLE MISCELLANEOUS ONCE
Status: COMPLETED | OUTPATIENT
Start: 2024-09-03 | End: 2024-09-03

## 2024-09-03 RX ADMIN — MORPHINE SULFATE 2 MG: 2 INJECTION, SOLUTION INTRAMUSCULAR; INTRAVENOUS at 11:30

## 2024-09-03 RX ADMIN — DOCUSATE SODIUM 100 MG: 100 CAPSULE, LIQUID FILLED ORAL at 21:17

## 2024-09-03 RX ADMIN — DOCUSATE SODIUM 100 MG: 100 CAPSULE, LIQUID FILLED ORAL at 08:28

## 2024-09-03 RX ADMIN — VENLAFAXINE HYDROCHLORIDE 150 MG: 150 CAPSULE, EXTENDED RELEASE ORAL at 21:16

## 2024-09-03 RX ADMIN — METRONIDAZOLE 500 MG: 500 INJECTION, SOLUTION INTRAVENOUS at 08:29

## 2024-09-03 RX ADMIN — APIXABAN 10 MG: 5 TABLET, FILM COATED ORAL at 21:17

## 2024-09-03 RX ADMIN — ACETAMINOPHEN 650 MG: 325 TABLET ORAL at 06:42

## 2024-09-03 RX ADMIN — CEPHALEXIN 500 MG: 500 CAPSULE ORAL at 21:16

## 2024-09-03 RX ADMIN — ONDANSETRON 4 MG: 2 INJECTION INTRAMUSCULAR; INTRAVENOUS at 10:55

## 2024-09-03 RX ADMIN — CEPHALEXIN 500 MG: 500 CAPSULE ORAL at 12:56

## 2024-09-03 RX ADMIN — MORPHINE SULFATE 2 MG: 2 INJECTION, SOLUTION INTRAMUSCULAR; INTRAVENOUS at 21:18

## 2024-09-03 RX ADMIN — METRONIDAZOLE 500 MG: 500 INJECTION, SOLUTION INTRAVENOUS at 01:15

## 2024-09-03 RX ADMIN — PANTOPRAZOLE SODIUM 40 MG: 40 TABLET, DELAYED RELEASE ORAL at 06:30

## 2024-09-03 RX ADMIN — MAGNESIUM CITRATE 148 ML: 1.75 LIQUID ORAL at 17:48

## 2024-09-03 RX ADMIN — APIXABAN 10 MG: 5 TABLET, FILM COATED ORAL at 09:38

## 2024-09-03 RX ADMIN — METRONIDAZOLE 500 MG: 500 TABLET ORAL at 23:28

## 2024-09-03 RX ADMIN — VENLAFAXINE HYDROCHLORIDE 187.5 MG: 150 CAPSULE, EXTENDED RELEASE ORAL at 21:15

## 2024-09-03 RX ADMIN — LAMOTRIGINE 25 MG: 25 TABLET ORAL at 21:16

## 2024-09-03 RX ADMIN — ONDANSETRON 4 MG: 2 INJECTION INTRAMUSCULAR; INTRAVENOUS at 17:01

## 2024-09-03 RX ADMIN — Medication 3 MG: at 21:17

## 2024-09-03 RX ADMIN — CEPHALEXIN 500 MG: 500 CAPSULE ORAL at 17:24

## 2024-09-03 RX ADMIN — METRONIDAZOLE 500 MG: 500 TABLET ORAL at 17:24

## 2024-09-03 RX ADMIN — CEPHALEXIN 500 MG: 500 CAPSULE ORAL at 06:30

## 2024-09-03 RX ADMIN — HEPARIN SODIUM 1600 UNITS/HR: 10000 INJECTION, SOLUTION INTRAVENOUS at 01:19

## 2024-09-03 ASSESSMENT — COGNITIVE AND FUNCTIONAL STATUS - GENERAL
DAILY ACTIVITIY SCORE: 24
MOBILITY SCORE: 24

## 2024-09-03 ASSESSMENT — PAIN SCALES - GENERAL
PAINLEVEL_OUTOF10: 7
PAINLEVEL_OUTOF10: 0 - NO PAIN

## 2024-09-03 ASSESSMENT — PAIN - FUNCTIONAL ASSESSMENT: PAIN_FUNCTIONAL_ASSESSMENT: 0-10

## 2024-09-03 NOTE — PROGRESS NOTES
Updates:   Notified by nursing staff that patient reports she was supposed to only be taking 25 mg of her lamotrigine following a period of not taking it during a prior hospitalization after she had discussed her dosing with her psychiatrist.  Of note, patient has now been admitted for a total of 72 hours, and she did already receive the 100 mg dose yesterday evening.  However, patient request that she be brought back down to the 25 mg for 3 doses as per her psychiatrist recommendations.  I have changed the dosing to reflect this and put a stop date after 3 doses; this will need to be reassessed and discussed with patient in the morning.

## 2024-09-03 NOTE — CARE PLAN
The patient's goals for the shift include      The clinical goals for the shift include be free from falls and injury

## 2024-09-03 NOTE — CONSULTS
Wound Care Consult     Visit Date: 9/3/2024      Patient Name: Olga Art         MRN: [id number]           YOB: 1970      Pertinent Labs:   Albumin   Date Value Ref Range Status   09/01/2024 3.5 3.4 - 5.0 g/dL Final       Wound Assessment:  Wound 09/03/24 Other (comment) Abdomen Left;Lower (Active)   Wound Image   09/03/24 1306   Site Assessment Red;Pink;Yellow 09/03/24 1306   Amisha-Wound Assessment Clean;Dry;Lacerated 09/03/24 1306   Non-staged Wound Description Full thickness 09/03/24 1306   Wound Length (cm) 1 cm 09/03/24 1306   Wound Width (cm) 2.6 cm 09/03/24 1306   Wound Surface Area (cm^2) 2.6 cm^2 09/03/24 1306   Wound Depth (cm) 0.3 cm 09/03/24 1306   Wound Volume (cm^3) 0.78 cm^3 09/03/24 1306   Margins Well-defined edges 09/03/24 1306   Drainage Description Serosanguineous 09/03/24 1306   Drainage Amount Small 09/03/24 1306   Dressing Silver dressing;Dry dressing 09/03/24 1306   Dressing Changed Changed 09/03/24 1306     Patient seen for ostomy take down site (present on admission) complicated by PMH: large bowel obstruction s/p Trip procedure, depression, bipolar 1 disorder, and anemia per chart. Per ED note “Pt had hx of Trip procedure, received end colostomy reversal, small bowel resection with anastomosis, lysis of adhesions and flexible sigmoidoscopy on 8/13/2024 then hospitalized on 8/23/2024-8/28/2024 for sepsis with concern of post-op infection. She was found to be anemic and received blood transfusion. Negative CTAP findings and was discharged with 10 days of Keflex and Flagyl.” Patient alert and oriented, states she was to have a follow up appointment today with her surgeon but will be calling to reschedule. Skin hygiene and dressing care provided. See detailed assessment above from flowsheet. Recommendations below, reviewed with Dr. Carbajal.     Wound location: Left abdomen (ostomy take down site)   Treatment protocol recommended:  Cleanse with vashe and pat dry.  Apply  aquacel ag cut to size, cover with clean dry dressing every other day/prn.   Continue to off load, turning at least every 2 hours. Offload heels.    Therapeutic surface: Patient on [employer] standard pressure relieving mattress during exam. Encouraged to turn/reposition atleast every 2 hours.     Nursing updated, continue pressure injury preventions, wound care to be completed by nursing per orders. and re-consult wound RN if needed.    See above recommendations for treatment. Patient to follow up with surgeon upon discharge. Patient given supplies for dressing care at home and verbalized understanding.     Please contact me with questions or changes in patient condition.  Amelia Davenport RN  [facility]   [phone]

## 2024-09-03 NOTE — PROGRESS NOTES
Olga Mendes  [id number]   Service: Internal Medicine / Hospitalist Date of service:9/3/2024                                  Full Code        Subjective        Review of Systems:   Review of system otherwise negative if not aforementioned above in subjective.  History Obtained From: Patient  Collateral History: Chart review     History Of Present Illness:  Olga Art is a 54 y.o. female with PMHx s/f large bowel obstruction s/p daphne procedure (pathology negative for malignancy), depression, bipolar 1 disorder, anemia presenting with abdominal pain. Pt had hx of daphne procedure, received end colostomy reversal, small bowel resection with anastomosis, lysis of adhesions and flexible sigmodioscopy on 8/13/2024 then hospitalized on 8/23/2024-8/28/2024 for sepsis with concern of post-op infection. She was found to be anemic and received blood transfusion. Negative CTAP findings and was discharged with 10 days of Keflex and Flagyl. She states her abdominal pain started last night, is constant and diffuse throughout.  Has associated nausea and vomiting.  She has only finished a day of her antibiotics, couldn't keep food/meds down with emesis.  Does not feel like her prior bowel obstruction.  She notes her bowel movements are slowly returning to normal function after the procedure and states to be still having loose bowel movements. Denies fever chills, lightheadedness, dizziness, chest pain, shortness of breath, changes in urinary symptoms, leg swelling, syncope.     ED Course (Summary):   Vitals on presentation: 98.4F, 108 bpm, 20 RR, 187/127, 97% on RA  Labs: CMP largely unremarkable  BNP 30, lipase less than 3, troponin negative  Coagulation-INR/PT 1.1/12.9  CBC-WBC 7.4, hemoglobin 10.2, neutrophils 5.45, platelets 709  UA negative  Imaging: CXR-No consolidations, sizeable effusions or pneumothorax. Tortuous and possibly ectatic aorta.  CT AP-Postoperative changes related to previous bowel surgery as above.  No bowel obstruction or free air. Questionable acute pulmonary embolic disease within visualized right lung base.  CTA for PE-Right-sided pulmonary emboli. No CT signs of right heart strain.   Interventions: Zofran, morphine 4 mg, LR 1 L, heparin bolus and drip, Rocephin, Flagyl, admission for further management        9/1...............Patient requesting a regular diet.  No reported: Hematuria, epistaxis, melena or hematochezia.Patient admits to abdominal discomfort after oral antibiotics.    9/2...............Patient endorsed constipation denies acute abdominal pain.  Disclose overall generalized malaise.  Patient has been reported patient is usually very up beat.    9/3...................No reported: Fevers, chills, palpitations, nausea or vomiting.   Patient still endorsed constipation.  She reports that she is supposed to start back on her Lamictal slowly at 25 mg dosage for 3 to 4 days then to 50 mg dosage 3 to 4 days and then back to 100 mg dosage.     Objective        Physical Exam      Constitutional:       Appearance: Patient appeared in no acute cardiopulmonary distress.     Comments: Patient alert and oriented to person place time and situation.Patient appeared nontoxic.  HEENT:      Head: Normocephalic and atraumatic.Trachea midline      Nose:No observed congestion or rhinorrhea.     Mouth/Throat: Mucous membranes Moist, Trachea appeared  midline.  Eyes:      Extraocular Movements: Extraocular movements intact.      Pupils: Pupils are equal, round, and reactive to light.      Comments: No scleral icterus or conjunctival injection appreciated.   Cardiovascular:      Rate and Rhythm: Normal rate and regular rhythm. No clicks rubs or gallops, normal S1 and S2.No peripheral stigmata of endocarditis appreciated.     Pulmonary:      Lungs appeared clear to auscultation, no adventitious sound appreciated.  Abdominal:      General: Abdomen soft, nontender, active bowel sounds, no involuntary guarding or rebound  tenderness appreciated.     Comments: None   Musculoskeletal:       Patient appeared to have full active range of motion for upper and lower extremities, no acute apparent joint deformity appreciated on examination.   No pitting edema or cyanosis appreciated.       Lymphadenopathy:      No appreciable palpable lymphadenopathy  Skin:     General: Skin is warm.      Coloration:  No jaundice     Findings: No abnormal appearing skin rashes or lesions that appeared acute noted on unclothed area of the skin..   Neurological:      General: No focal sensory or motor deficits appreciated, no meningeal signs or dysmetria noted.      Cranial Nerves: Cranial nerves II to XII appearing grossly intact.     Genitals:  Deferred  Psychiatric:         The patient appears to be displaying normal mood and affect at the time of evaluation.     Labs:               Lab Results   Component Value Date     GLUCOSE 125 (H) 09/01/2024     CALCIUM 8.9 09/01/2024      09/01/2024     K 4.0 09/01/2024     CO2 26 09/01/2024      09/01/2024     BUN 8 09/01/2024     CREATININE 0.67 09/01/2024                Lab Results   Component Value Date     WBC 7.5 09/01/2024     HGB 9.1 (L) 09/01/2024     HCT 29.8 (L) 09/01/2024     MCV 93 09/01/2024      (H) 09/01/2024      Lab Results   Component Value Date    GLUCOSE 110 (H) 09/03/2024    CALCIUM 8.0 (L) 09/03/2024     09/03/2024    K 4.0 09/03/2024    CO2 24 09/03/2024     09/03/2024    BUN 10 09/03/2024    CREATININE 0.76 09/03/2024      Lab Results   Component Value Date    WBC 6.3 09/02/2024    HGB 8.9 (L) 09/02/2024    HCT 29.2 (L) 09/02/2024    MCV 92 09/02/2024     (H) 09/02/2024      @LAB.APCYTOINTERPRETATION@   [unfilled]   Susceptibility data from last 90 days.  Collected Specimen Info Organism Ampicillin Ciprofloxacin Levofloxacin Nitrofurantoin Penicillin Trimethoprim/Sulfamethoxazole Vancomycin   08/23/24 Urine from Clean Catch/Voided Enterococcus faecalis  S   I  S  S  S  R  S                   X-rays/ Images     [unfilled]   Radiology Results (last 21 days)     Procedure Component Value Units Date/Time   CT angio chest for pulmonary embolism [[id number]] Collected: 08/31/24 1315   Order Status: Completed Updated: 08/31/24 1317   Narrative:     Interpreted By:  Keegan Solo,  STUDY:  CT ANGIO CHEST FOR PULMONARY EMBOLISM;  8/31/2024 12:45 pm      INDICATION:  Signs/Symptoms:Possible PE seen on CT.      COMPARISON:  CT abdomen and pelvis from same day.      ACCESSION NUMBER(S):  [id number]      ORDERING CLINICIAN:  ERIC LEIVA      TECHNIQUE:  Helical data acquisition of the chest was obtained with IV contrast  material.  Images were reformatted in axial, coronal, and sagittal  planes.      FINDINGS:  Lungs and Pleura: Bibasilar and lingular atelectasis. No pulmonary  consolidation. No pleural effusion. No pneumothorax.      Mediastinum and axilla: Right lower lobe and right upper lobe  pulmonary emboli extending is proximally to the lobar branch point.  No pulmonary artery enlargement or right ventricular enlargement to  indicate right heart strain. No adenopathy by CT size criteria. No  cardiomegaly or pericardial effusion. No thoracic aortic aneurysm.  Atherosclerosis. Advanced coronary artery calcifications.      Visualized Upper Abdomen: Refer to dedicated CT abdomen and pelvis  from same day for details.      MSK/Chest Wall: No aggressive bony lesion identified. Multilevel  degenerative changes in the spine.       Impression:     Right-sided pulmonary emboli. No CT signs of right heart strain.          Keegan Solo sent epic message to  ERIC LEIVA on 8/31/2024 at  1:08 pm.  (**-RCF-**) Findings:  See findings.              Signed by: Keegan Solo 8/31/2024 1:14 PM  Dictation workstation:   [id number]   CT abdomen pelvis w IV contrast [[id number]] Collected: 08/31/24 1147   Order Status: Completed Updated: 08/31/24 1147   Narrative:     Interpreted By:   Srikanth Andres,  STUDY:  CT ABDOMEN PELVIS W IV CONTRAST;  8/31/2024 11:11 am      INDICATION:  Signs/Symptoms:History of bowel obstruction status post colostomy  with reversal complicated by recent infection, still on antibiotics.  1 day of diffuse abdominal pain, lack of bowel movement, and  nausea/vomiting..      COMPARISON:  None.      ACCESSION NUMBER(S):  [id number]      ORDERING CLINICIAN:  ERIC LEIVA      TECHNIQUE:  CT of the abdomen and pelvis was performed.  Standard contiguous  axial images were obtained at 3 mm slice thickness through the  abdomen and pelvis. Coronal and sagittal reconstructions at 3 mm  slice thickness were performed.      75  ml of contrast Omnipaque 350 were administered intravenously  without immediate complication.      FINDINGS:  LOWER CHEST:  Questionable incidental filling defect within the right lower lobe  pulmonary artery branches concerning for acute aortic pulmonary  embolic disease. Evaluation is limited. Minimal lingular atelectasis,  scarring or mild infiltrate, incompletely included in field of view.  The remainder of the visualized lung bases otherwise clear.      ABDOMEN:      LIVER:  Liver is normal in size. No focal lesions are seen.      BILE DUCTS:  Biliary system is nondilated.      GALLBLADDER:  The gallbladder has been surgically removed.      PANCREAS:  No focal lesions. No peripancreatic fat stranding.      SPLEEN:  The spleen is normal in size. No focal abnormalities are seen.      ADRENAL GLANDS:  The adrenal glands bilaterally within normal limits.      KIDNEYS AND URETERS:  No hydronephrosis or renal masses. No perinephric stranding. No  radiodense renal calculi.      PELVIS:      BLADDER:  Within normal limits as distended. No bladder calculi or masses.      REPRODUCTIVE ORGANS:  The uterus is not seen and may have been surgically removed. No  pelvic free fluid, masses or lymphadenopathy      BOWEL:  Surgical anastomotic sutures in the rectosigmoid  and central lower  abdominal small bowel. Small and large bowel are nondilated. Surgical  scars within the midline ventral abdominal wall and to the left of  midline related to previous colostomy site. The appendix is not  positively identified, however, no findings to suggest acute  appendicitis is seen. No mesenteric edema or lymphadenopathy. No  fluid collections.      VESSELS:  Mild wall calcification of the aorta. No aneurysm or dissection.      PERITONEUM/RETROPERITONEUM/LYMPH NODES:  No retroperitoneal masses are seen.  No lymphadenopathy.      BONES AND ABDOMINAL WALL:  There is no evidence for destructive lytic or blastic bone lesions  identified.  Mild multilevel discogenic degenerative changes.       Impression:     1.  Postoperative changes related to previous bowel surgery as above.  No bowel obstruction or free air.  2. Questionable acute pulmonary embolic disease within visualized  right lung base. Study not optimized for evaluation of pulmonary  artery branches. Clinical correlation recommended. Further evaluation  with chest CT can be performed for better assessment as clinically  warranted.  3. Additional detailed findings as above.      SUPPLEMENTAL INFORMATION:  Notifi message was left for ERIC LEIVA regarding this exam by Dr. Andres on 8/31/2024 at approximately 11:43 hours. Critical Finding:  See findings. Notification was initiated on 8/31/2024 at 11:43 am by  Srikanth Andres.  (**-YCF-**) Instructions:      Signed by: Srikanth Andres 8/31/2024 11:46 AM  Dictation workstation:   [id number]   XR chest 1 view [[id number]] Collected: 08/31/24 1113   Order Status: Completed Updated: 08/31/24 1113   Narrative:     Interpreted By:  Srikanth Andres,  STUDY:  XR CHEST 1 VIEW;; 8/31/2024 10:40 am      INDICATION:  Signs/Symptoms:abdominal pain.      COMPARISON:  CT abdomen from 08/23/2024      ACCESSION NUMBER(S):  [id number]      ORDERING CLINICIAN:  ERIC LEIVA      FINDINGS:  Tortuous and possibly  ectatic aorta. Evaluation limited to portable  technique and positioning. Dissection can not be excluded in the  basis of this exam. Left basilar atelectasis or scarring. No  consolidations, sizeable effusions or pneumothorax. Degenerative  changes of bilateral AC joints.       Impression:     1. No consolidations, sizeable effusions or pneumothorax.      2. Tortuous and possibly ectatic aorta. Acute aortic pathology not  excluded in the basis of this exam only. If such clinical concern  persists, further evaluation with chest CT should be considered for  better assessment.              Signed by: Srikanth Andres 8/31/2024 11:12 AM  Dictation workstation:   [id number]                  Medical Problems         Problem List         * (Principal) Pulmonary embolism without acute cor pulmonale, unspecified chronicity, unspecified pulmonary embolism type (Multi)     Bipolar 1 disorder (Multi)     Complete intestinal obstruction (Multi)     Depressive disorder     History of colostomy reversal     Obstruction of colon (Multi)     Peptic ulcer with hemorrhage and perforation and with obstruction (Multi)     Severe episode of recurrent major depressive disorder (Multi)     Overview Signed 8/31/2024  2:14 PM by Cely Broussard PA-C       ICD-10 utility update                        Above medical problems may be reflective of historical medical problems that may have resolved and may not related to acute clinical condition/medical problems.     Clinical impression/plan:        54 y.o. female with PMHx s/f large bowel obstruction s/p daphne procedure (pathology negative for malignancy), depression, anemia presenting with abdominal pain.      Pulmonary embolism without acute cor pulmonale  -presumed provoked 2/2 to recent abdominal surgery and periods of immobilization  -Vascular US LE bilateral pending  -Received heparin bolus, continue on heparin drip     Abdominal pain  -CT AP-Postoperative changes related to previous bowel  surgery as above. No bowel obstruction or free air.   -no fever, normal WBC  -Continue on p.o. Keflex and Flagyl     Anemia  -Hgb 10.2, stable and platelets improving at 709 compared to 523 from last hospitalization on 8/22/2024  -Continue to trend while admitted and transfuse as necessary     Depression  -Continue on home venlafaxine     Bipolar 1 disorder  -Continue on lamotrigine     Diet: Cardiac  DVT Prophylaxis: Heparin  Code Status: Full code        Disposition/additional care plan/interventions: 9/1/2024     Continue heparin drip     Monitor clinical signs of bleeding     Monitor electrolytes     Nutritionist consult     Discussion at bedside later today with patient  concerning care plan, patient's  in agreement with care plan.     Suspect provoked VTE.  Formal consult to hematology oncology concerning additional workup.  If okay with hematology oncology anticipate transition to oral anticoagulation/NOAC in the next 24 hours.     Await ultrasound of lower extremities bilaterally     Clinical suspicion for likely iatrogenic related abdominal discomfort related to oral Flagyl, transition to IV therapy and monitor clinical response.     Monitor H&H/CBC.            Disposition/additional care plan/interventions: 9/2/2024     Non acute appearing abdominal examination, no rebound tenderness appreciated.  Constipation reported, active bowel sounds, no involuntary guarding appreciated.     CT scan of abdomen on presentation :1.  Postoperative changes related to previous bowel surgery  no bowel obstruction or free air.      Continue monitoring, continue stool softeners,     Continue heparin drip today     Patient reports marked nausea after switching to IV Flagyl.,  Reported penicillin allergy, continue antibiotic therapy.     Continue heparin drip likely transition to oral therapy next 24 hours of.  Patient and  felt that overall patient was discharged too early from her prior hospitalization  before presentation here..     Monitor H&H     Incentive spirometer     Physical therapy     Await lower extremity ultrasounds results  for VTE        Disposition/additional care plan/interventions: 9/3/2024      Transition to Eliquis therapy    Appreciate recommendation and input from hematology.    Lack of IV access, known penicillin allergy, trial of continuing anaerobic coverage with oral Flagyl    Abdominal examination still appeared benign, nonacute abdomen noted, continue laxative therapy.    Monitor tolerance to Flagyl, take with food or a possible antiemetic before therapy.     The patient was informed of differential diagnosis , work up , plan of care and possible sequelae of clinical disposition.Patient in agreement with plan of care. Further recommendations forthcoming in accordance with patient's clinical disposition and response to care.     Discharge planning: Anticipate likely discharge in next 24 hours.Monitor tolerance to oral antibiotics, treat constipation to optimize before discharge, monitor response to oral antihypertensive therapy/NOAC     Care time:>35 mins              Dictation performed with assistance of voice recognition device therefore transcription errors are possible.

## 2024-09-03 NOTE — PROGRESS NOTES
09/03/24 1122   Discharge Planning   Living Arrangements Children;Spouse/significant other   Support Systems Spouse/significant other   Assistance Needed none   Type of Residence Private residence   Home or Post Acute Services None   Expected Discharge Disposition Home   Does the patient need discharge transport arranged? No     Discharge planning assessment completed with patient. Patient is independent with mobility and care needs. She follows at [facility] in [place] for primary care. Patient plans to return home at discharge and denies needs at this time.

## 2024-09-03 NOTE — CONSULTS
Reason For Consult  Pulmonary embolism    History Of Present Illness  Olga Art is a 54 y.o. female with PMHx s/f large bowel obstruction s/p daphne procedure (pathology negative for malignancy), depression, bipolar 1 disorder, anemia presenting with abdominal pain. Pt had hx of daphne procedure, received end colostomy reversal, small bowel resection with anastomosis, lysis of adhesions and flexible sigmodioscopy on 8/13/2024 then hospitalized on 8/23/2024-8/28/2024 for sepsis with concern of post-op infection. She was found to be anemic and received blood transfusion. Negative CTAP findings and was discharged with 10 days of Keflex and Flagyl. She states her abdominal pain started last night, is constant and diffuse throughout.  Has associated nausea and vomiting.  She has only finished a day of her antibiotics, couldn't keep food/meds down with emesis.  Does not feel like her prior bowel obstruction.  She notes her bowel movements are slowly returning to normal function after the procedure and states to be still having loose bowel movements. Denies fever chills, lightheadedness, dizziness, chest pain, shortness of breath, changes in urinary symptoms, leg swelling, syncope.     ED Course (Summary):   Vitals on presentation: 98.4F, 108 bpm, 20 RR, 187/127, 97% on RA  Labs: CMP largely unremarkable  BNP 30, lipase less than 3, troponin negative  Coagulation-INR/PT 1.1/12.9  CBC-WBC 7.4, hemoglobin 10.2, neutrophils 5.45, platelets 709  UA negative  Imaging: CXR-No consolidations, sizeable effusions or pneumothorax. Tortuous and possibly ectatic aorta.  CT AP-Postoperative changes related to previous bowel surgery as above. No bowel obstruction or free air. Questionable acute pulmonary embolic disease within visualized right lung base.  CTA for PE-Right-sided pulmonary emboli. No CT signs of right heart strain.   Interventions: Zofran, morphine 4 mg, LR 1 L, heparin bolus and drip, Rocephin, Flagyl, admission  "for further management     Patient admitted in the hospital because of right pulmonary embolism on heparin drip.  Hematology consult is requested for pulmonary embolism.    Postoperatively patient was not very active, family history negative for blood clot this is the first episode of pulmonary embolism no history of blood clot in the past.  Medical record reviewed     Past Medical History  She has no past medical history on file.    Surgical History  She has no past surgical history on file.     Social History  She reports that she has never smoked. She has never used smokeless tobacco. She reports that she does not currently use alcohol. She reports current drug use. Drug: Marijuana.    Family History  No family history on file.     Allergies  Celery seed, Penicillins, and Tramadol    Review of Systems  Complains of weakness fatigue, very depressed, no chest pain no shortness of breath, nausea vomiting is better, no abdominal pain patient is ambulating     Physical Exam  Vitals are stable very depressed    Neck supple  Lungs examination did show fair air movement on both side    Heart with normal regular rhythm    Abdomen is soft, nontender, well-healed surgical scar small wound on the left flank area     extremity no edema cyanosis     Last Recorded Vitals  Blood pressure 135/87, pulse 84, temperature 36.5 °C (97.7 °F), temperature source Temporal, resp. rate 18, height 1.702 m (5' 7\"), weight 86.3 kg (190 lb 4.1 oz), SpO2 97%.    Relevant Results   Latest Reference Range & Units 09/02/24 04:13   GLUCOSE 74 - 99 mg/dL 102 (H)   SODIUM 136 - 145 mmol/L 137   POTASSIUM 3.5 - 5.3 mmol/L 3.9   CHLORIDE 98 - 107 mmol/L 102   Bicarbonate 21 - 32 mmol/L 28   Anion Gap 10 - 20 mmol/L 11   Blood Urea Nitrogen 6 - 23 mg/dL 9   Creatinine 0.50 - 1.05 mg/dL 0.73   EGFR >60 mL/min/1.73m*2 >90   Calcium 8.6 - 10.3 mg/dL 9.0   MAGNESIUM 1.60 - 2.40 mg/dL 1.88   Heparin Unfractionated See Comment Below for Therapeutic Ranges " IU/mL 0.7   WBC 4.4 - 11.3 x10*3/uL 6.3   nRBC 0.0 - 0.0 /100 WBCs 0.0   RBC 4.00 - 5.20 x10*6/uL 3.17 (L)   HEMOGLOBIN 12.0 - 16.0 g/dL 8.9 (L)   HEMATOCRIT 36.0 - 46.0 % 29.2 (L)   MCV 80 - 100 fL 92   MCH 26.0 - 34.0 pg 28.1   MCHC 32.0 - 36.0 g/dL 30.5 (L)   RED CELL DISTRIBUTION WIDTH 11.5 - 14.5 % 15.3 (H)   Platelets 150 - 450 x10*3/uL 554 (H)   (H): Data is abnormally high  (L): Data is abnormally low  CT chest, angio,Right-sided pulmonary emboli. No CT signs of right heart strain.   CT abdominal pelvis,1.  Postoperative changes related to previous bowel surgery as above.  No bowel obstruction or free air.  2. Questionable acute pulmonary embolic disease within visualized  right lung base. Study not optimized for evaluation of pulmonary  artery branches. Clinical correlation recommended. Further evaluation  with chest CT can be performed for better assessment as clinically  warranted.  3. Additional detailed findings as above.  Assessment/Plan     #1 right pulmonary embolism most probably due to decreased activity which is due to postoperative.    2.  Chronic anemia due to chronic disease monitor CBC    3.  History of large bowel obstruction status post Trip procedure no malignancy    Most probably pulmonary embolism is due to decreased activity, primary thrombophilia seem to be less likely.  I will initiate primary thrombophilia workup and check lupus anticoagulant antibody.  Change heparin drip to Eliquis.  Patient has very poor IV access.  Discussed with patient    I spent 45 minutes in the professional and overall care of this patient.      Aye Tolentino MD

## 2024-09-03 NOTE — CONSULTS
Nutrition Initial Assessment:   Nutrition Assessment    Reason for Assessment: Provider consult order    Medical history per chart:   54 y.o. female with PMHx s/f large bowel obstruction s/p daphne procedure (pathology negative for malignancy), depression, bipolar 1 disorder, anemia presenting with abdominal pain. Pt had hx of daphne procedure, received end colostomy reversal, small bowel resection with anastomosis, lysis of adhesions and flexible sigmodioscopy on 8/13/2024 then hospitalized on 8/23/2024-8/28/2024 for sepsis with concern of post-op infection.     9/3:  Pt fatigued, reports tried to eat some eggs today, but had emesis.  Pt also reports ABD pain, and poor intake for weeks due to multiple hospital admissions in past few weeks.  Pt agreeable to Ensure once ABD pain resolves.      Nutrition History:  Food and Nutrient History: Decreased PO intake for weeks due to multiple hospitals admissions  Energy Intake: Poor < 50 %    Current Diet: Adult diet Regular    Average meal Intake during admission: <25%       Nutrition Related Findings:   Oral Symptoms: none     GI symptoms: nausea, emesis, and abdominal pain.   BM:    Food allergies: NKFA. is allergic to celery seed, penicillins, and tramadol.  Meds/Labs reviewed.  apixaban, 10 mg, oral, BID   Followed by  [START ON 9/10/2024] apixaban, 5 mg, oral, BID  cephalexin, 500 mg, oral, 4x daily  docusate sodium, 100 mg, oral, BID  lamoTRIgine, 25 mg, oral, Daily  [Held by provider] magnesium citrate, 148 mL, oral, Once  metroNIDAZOLE, 500 mg, intravenous, q8h  [Held by provider] metroNIDAZOLE, 500 mg, oral, TID  pantoprazole, 40 mg, oral, Daily before breakfast   Or  pantoprazole, 40 mg, intravenous, Daily before breakfast  polyethylene glycol, 17 g, oral, Daily  venlafaxine XR, 150 mg, oral, Daily  venlafaxine XR, 37.5 mg, oral, Daily             Nutrition Significant Labs:    Results from last 7 days   Lab Units 09/03/24  0640 09/02/24  4613 09/01/24  8955  "  GLUCOSE mg/dL 110* 102* 125*   SODIUM mmol/L 137 137 136   POTASSIUM mmol/L 4.0 3.9 4.0   CHLORIDE mmol/L 104 102 102   CO2 mmol/L 24 28 26   BUN mg/dL 10 9 8   CREATININE mg/dL 0.76 0.73 0.67   EGFR mL/min/1.73m*2 >90 >90 >90   CALCIUM mg/dL 8.0* 9.0 8.9   MAGNESIUM mg/dL  --  1.88  --      No results found for: \"HGBA1C\"        Anthropometrics:  Height: 170.2 cm (5' 7\")   Weight: 86.3 kg (190 lb 4.1 oz)   BMI (Calculated): 29.79  IBW/kg (Dietitian Calculated): 61 kg          Weight History:   Wt Readings from Last 10 Encounters:   08/31/24 86.3 kg (190 lb 4.1 oz)   08/22/24 88.5 kg (195 lb 1.7 oz)        Weight Change %:  Weight History / % Weight Change: Per records: 2/9/24: 215# - noted a 11.6% loss x 7 months , 7/17/24: 195# - stable x 1.5 months, but will follow weights for accuracy  Significant Weight Loss: Yes  Interpretation of Weight Loss: >10% in 6 months       Nutrition Focused Physical Exam Findings:  defer: Dark room, patient not feeling well   Subcutaneous Fat Loss:      Muscle Wasting:     Edema:     Physical Findings:  Skin: Positive (ABD incision/wound)    Estimated Needs:   Total Energy Estimated Needs (kCal): 1830 kCal  Method for Estimating Needs: 30 kcal/kg IBW  Total Protein Estimated Needs (g): 92 g  Method for Estimating Needs: 1.5 gm/kg IBW     Method for Estimating Needs: 1ml/kcal        Nutrition Diagnosis   Nutrition Diagnosis:  Malnutrition Diagnosis  Patient has Malnutrition Diagnosis: Yes  Diagnosis Status: New  Malnutrition Diagnosis: Moderate malnutrition related to chronic disease or condition  As Evidenced by: sig weight loss of 11.6% x 7 months, PO intake <75% for >1 month    Nutrition Diagnosis  Patient has Nutrition Diagnosis: Yes  Diagnosis Status (1): New  Nutrition Diagnosis 1: Predicted inadequate energy intake  Related to (1): GI function; N/V, and ABD pain, multiple hospital admissions  As Evidenced by (1): PO intake <75%       Nutrition Interventions/Recommendations "   Nutrition Interventions and Recommendations:        Nutrition Prescription:  Individualized Nutrition Prescription Provided for : Supplements        Nutrition Interventions:   Food and/or Nutrient Delivery Interventions  Interventions: Medical food supplement  Medical Food Supplement: Commercial beverage  Goal: Kash Ensure plus high protein BID    Coordination of Nutrition Care by a Nutrition Professional  Collaboration and Referral of Nutrition Care: Collaboration by nutrition professional with other providers  Goal: RN    Nutrition Education:   Education Documentation  No documentation found.      Nutrition Counseling  Counseling Theoretical Approach: Nutrition counseling based on health belief model  Goal: Reviewed supplements       Nutrition Monitoring and Evaluation   Monitoring/Evaluation:   Food/Nutrient Related History Monitoring  Monitoring and Evaluation Plan: Energy intake  Energy Intake: Estimated energy intake  Criteria: PO intake >50%    Body Composition/Growth/Weight History  Monitoring and Evaluation Plan: Weight  Weight: Measured weight  Criteria: Stable weight    Biochemical Data, Medical Tests and Procedures  Monitoring and Evaluation Plan: Electrolyte/renal panel, Glucose/endocrine profile  Electrolyte and Renal Panel: BUN, Sodium, Calcium, serum, Chloride, Creatinine, Magnesium, Phosphorus, Potassium  Criteria: WNL  Glucose/Endocrine Profile: Glucose, casual  Criteria: WNL    Nutrition Focused Physical Findings  Monitoring and Evaluation Plan: Skin  Skin: Impaired wound healing  Criteria: Promote healing            Time Spent/Follow-up Reminder:   Follow Up  Time Spent (min): 45 minutes  Last Date of Nutrition Visit: 09/03/24  Nutrition Follow-Up Needed?: Dietitian to reassess per policy  Follow up Comment: 9/6

## 2024-09-04 ENCOUNTER — PHARMACY VISIT (OUTPATIENT)
Dept: PHARMACY | Facility: CLINIC | Age: 54
End: 2024-09-04
Payer: COMMERCIAL

## 2024-09-04 VITALS
OXYGEN SATURATION: 96 % | BODY MASS INDEX: 29.86 KG/M2 | DIASTOLIC BLOOD PRESSURE: 77 MMHG | HEIGHT: 67 IN | HEART RATE: 83 BPM | WEIGHT: 190.26 LBS | SYSTOLIC BLOOD PRESSURE: 112 MMHG | TEMPERATURE: 97.2 F | RESPIRATION RATE: 19 BRPM

## 2024-09-04 LAB
ANION GAP SERPL CALC-SCNC: 10 MMOL/L (ref 10–20)
BACTERIA BLD CULT: NORMAL
BACTERIA BLD CULT: NORMAL
BUN SERPL-MCNC: 7 MG/DL (ref 6–23)
CALCIUM SERPL-MCNC: 8.2 MG/DL (ref 8.6–10.3)
CHLORIDE SERPL-SCNC: 103 MMOL/L (ref 98–107)
CO2 SERPL-SCNC: 29 MMOL/L (ref 21–32)
CREAT SERPL-MCNC: 0.77 MG/DL (ref 0.5–1.05)
EGFRCR SERPLBLD CKD-EPI 2021: >90 ML/MIN/1.73M*2
ERYTHROCYTE [DISTWIDTH] IN BLOOD BY AUTOMATED COUNT: 15.1 % (ref 11.5–14.5)
FERRITIN SERPL-MCNC: 38 NG/ML (ref 8–150)
GLUCOSE SERPL-MCNC: 108 MG/DL (ref 74–99)
HAPTOGLOB SERPL NEPH-MCNC: 180 MG/DL (ref 30–200)
HCT VFR BLD AUTO: 28 % (ref 36–46)
HGB BLD-MCNC: 8.6 G/DL (ref 12–16)
IRON SATN MFR SERPL: 9 %
IRON SERPL-MCNC: 34 UG/DL
LDH SERPL L TO P-CCNC: 129 U/L (ref 84–246)
MCH RBC QN AUTO: 27.8 PG (ref 26–34)
MCHC RBC AUTO-ENTMCNC: 30.7 G/DL (ref 32–36)
MCV RBC AUTO: 91 FL (ref 80–100)
NRBC BLD-RTO: 0 /100 WBCS (ref 0–0)
PLATELET # BLD AUTO: 454 X10*3/UL (ref 150–450)
POTASSIUM SERPL-SCNC: 3.7 MMOL/L (ref 3.5–5.3)
RBC # BLD AUTO: 3.09 X10*6/UL (ref 4–5.2)
SODIUM SERPL-SCNC: 138 MMOL/L (ref 136–145)
TIBC SERPL-MCNC: 377 UG/DL
UIBC SERPL-MCNC: 343 UG/DL
VIT B12 SERPL-MCNC: 271 PG/ML (ref 211–911)
WBC # BLD AUTO: 5.4 X10*3/UL (ref 4.4–11.3)

## 2024-09-04 PROCEDURE — 36415 COLL VENOUS BLD VENIPUNCTURE: CPT | Performed by: HOSPITALIST

## 2024-09-04 PROCEDURE — 80048 BASIC METABOLIC PNL TOTAL CA: CPT | Performed by: HOSPITALIST

## 2024-09-04 PROCEDURE — 2500000001 HC RX 250 WO HCPCS SELF ADMINISTERED DRUGS (ALT 637 FOR MEDICARE OP): Performed by: INTERNAL MEDICINE

## 2024-09-04 PROCEDURE — 99239 HOSP IP/OBS DSCHRG MGMT >30: CPT | Performed by: HOSPITALIST

## 2024-09-04 PROCEDURE — 99232 SBSQ HOSP IP/OBS MODERATE 35: CPT | Performed by: INTERNAL MEDICINE

## 2024-09-04 PROCEDURE — 2500000001 HC RX 250 WO HCPCS SELF ADMINISTERED DRUGS (ALT 637 FOR MEDICARE OP): Performed by: HOSPITALIST

## 2024-09-04 PROCEDURE — 82728 ASSAY OF FERRITIN: CPT | Performed by: INTERNAL MEDICINE

## 2024-09-04 PROCEDURE — 36415 COLL VENOUS BLD VENIPUNCTURE: CPT | Performed by: INTERNAL MEDICINE

## 2024-09-04 PROCEDURE — 83615 LACTATE (LD) (LDH) ENZYME: CPT | Performed by: INTERNAL MEDICINE

## 2024-09-04 PROCEDURE — RXMED WILLOW AMBULATORY MEDICATION CHARGE

## 2024-09-04 PROCEDURE — 2500000001 HC RX 250 WO HCPCS SELF ADMINISTERED DRUGS (ALT 637 FOR MEDICARE OP)

## 2024-09-04 PROCEDURE — 85027 COMPLETE CBC AUTOMATED: CPT | Performed by: HOSPITALIST

## 2024-09-04 PROCEDURE — 83550 IRON BINDING TEST: CPT | Mod: PORLAB | Performed by: INTERNAL MEDICINE

## 2024-09-04 PROCEDURE — 83010 ASSAY OF HAPTOGLOBIN QUANT: CPT | Mod: PORLAB | Performed by: INTERNAL MEDICINE

## 2024-09-04 PROCEDURE — 82607 VITAMIN B-12: CPT | Performed by: INTERNAL MEDICINE

## 2024-09-04 RX ORDER — ACETAMINOPHEN 325 MG/1
650 TABLET ORAL EVERY 4 HOURS PRN
Start: 2024-09-04

## 2024-09-04 RX ORDER — ONDANSETRON 4 MG/1
4 TABLET, ORALLY DISINTEGRATING ORAL EVERY 8 HOURS PRN
Qty: 20 TABLET | Refills: 0 | Status: SHIPPED | OUTPATIENT
Start: 2024-09-04

## 2024-09-04 RX ORDER — POLYETHYLENE GLYCOL 3350 17 G/17G
17 POWDER, FOR SOLUTION ORAL DAILY
Qty: 510 G | Refills: 0 | Status: SHIPPED | OUTPATIENT
Start: 2024-09-05

## 2024-09-04 RX ORDER — DOCUSATE SODIUM 100 MG/1
100 CAPSULE, LIQUID FILLED ORAL 2 TIMES DAILY
Qty: 60 CAPSULE | Refills: 0 | Status: SHIPPED | OUTPATIENT
Start: 2024-09-04 | End: 2024-10-04

## 2024-09-04 RX ORDER — LAMOTRIGINE 25 MG/1
25 TABLET ORAL DAILY
Start: 2024-09-04 | End: 2024-09-05

## 2024-09-04 RX ADMIN — CEPHALEXIN 500 MG: 500 CAPSULE ORAL at 06:01

## 2024-09-04 RX ADMIN — METRONIDAZOLE 500 MG: 500 TABLET ORAL at 08:21

## 2024-09-04 RX ADMIN — CEPHALEXIN 500 MG: 500 CAPSULE ORAL at 13:55

## 2024-09-04 RX ADMIN — METRONIDAZOLE 500 MG: 500 TABLET ORAL at 14:29

## 2024-09-04 RX ADMIN — DOCUSATE SODIUM 100 MG: 100 CAPSULE, LIQUID FILLED ORAL at 08:21

## 2024-09-04 RX ADMIN — PANTOPRAZOLE SODIUM 40 MG: 40 TABLET, DELAYED RELEASE ORAL at 06:00

## 2024-09-04 RX ADMIN — APIXABAN 10 MG: 5 TABLET, FILM COATED ORAL at 08:20

## 2024-09-04 ASSESSMENT — COGNITIVE AND FUNCTIONAL STATUS - GENERAL
DAILY ACTIVITIY SCORE: 24
MOBILITY SCORE: 24

## 2024-09-04 ASSESSMENT — PAIN SCALES - GENERAL: PAINLEVEL_OUTOF10: 0 - NO PAIN

## 2024-09-04 NOTE — PROGRESS NOTES
"Olga Art is a 54 y.o. female on day 4 of admission presenting with Pulmonary embolism without acute cor pulmonale, unspecified chronicity, unspecified pulmonary embolism type (Multi).    Subjective     no acute discomfort clinically status same       Objective     Physical Exam  Lungs examination did show fair air movement on both side     Heart with normal regular rhythm     Abdomen is soft, nontender, well-healed surgical scar small wound on the left flank area      extremity no edema cyanosis  Last Recorded Vitals  Blood pressure 126/85, pulse 77, temperature 36.4 °C (97.6 °F), temperature source Temporal, resp. rate 17, height 1.702 m (5' 7\"), weight 86.3 kg (190 lb 4.1 oz), SpO2 98%.  Intake/Output last 3 Shifts:  I/O last 3 completed shifts:  In: 240 (2.8 mL/kg) [P.O.:240]  Out: - (0 mL/kg)   Weight: 86.3 kg     Relevant Results   Latest Reference Range & Units 09/04/24 04:21   WBC 4.4 - 11.3 x10*3/uL 5.4   nRBC 0.0 - 0.0 /100 WBCs 0.0   RBC 4.00 - 5.20 x10*6/uL 3.09 (L)   HEMOGLOBIN 12.0 - 16.0 g/dL 8.6 (L)   HEMATOCRIT 36.0 - 46.0 % 28.0 (L)   MCV 80 - 100 fL 91   MCH 26.0 - 34.0 pg 27.8   MCHC 32.0 - 36.0 g/dL 30.7 (L)   RED CELL DISTRIBUTION WIDTH 11.5 - 14.5 % 15.1 (H)   Platelets 150 - 450 x10*3/uL 454 (H)   (L): Data is abnormally low  (H): Data is abnormally high         Assessment/Plan   #1 right pulmonary embolism most probably due to decreased activity which is due to postoperative.     2.  Chronic anemia due to chronic disease monitor CBC     3.  History of large bowel obstruction status post Trip procedure , no malignancy    Patient is on Eliquis continue to monitor CBC I will repeat iron study, follow-up as outpatient     I spent 30 minutes in the professional and overall care of this patient.      Aye Tolentino MD      "

## 2024-09-04 NOTE — NURSING NOTE
IV access removed.  Verbalized understanding of discharge instructions.  Meds from pharmacy sent with patient

## 2024-09-04 NOTE — CARE PLAN
"PRIMARY DIAGNOSIS: \"GENERIC\" NURSING  OUTPATIENT/OBSERVATION GOALS TO BE MET BEFORE DISCHARGE:  1. ADLs back to baseline: No    2. Activity and level of assistance: Up with standby assistance.    3. Pain status: Improved but still requiring IV narcotics.    4. Return to near baseline physical activity: No     Discharge Planner Nurse   Safe discharge environment identified: No  Barriers to discharge: Yes       Entered by: Thais Nam RN 01/16/2023 5:00 AM     Please review provider order for any additional goals.   Nurse to notify provider when observation goals have been met and patient is ready for discharge.    A&O, calls appropriately.   VSS, afebrile. T max 99F  Obs status  IVF infusing via L PIV  Regular diet. BS active. No BM this shift.   UA completed, abnormal. Review results page. Notified on-call provider Dr. Macario.   Provider added order to notify Doc when pt symptomatic. Pt did not c/o pain or burning while urinating.   Up SBA, walks slow.   Sclera jaundice.   RUQ pain dilaudid and oxycodone given prn.   K protocol.   " The patient's goals for the shift include      The clinical goals for the shift include safety throghout shift and pain 3 ro less.

## 2024-09-04 NOTE — DISCHARGE SUMMARY
Discharge Summary    Olga Art    [id number]     8/31/2024  8:49 AM , admit date    9/4/2024, discharge date      .      Discharge Diagnosis:        1.  Pulmonary embolism without acute cor pulmonale    2.  Bipolar disorder    3.  Depression    4.  Abdominal pain and presentation, with negative appearing CT scan of abdomen and pelvis.    5.  Status post colostomy reversal, small bowel resection with anastomosis, ocedure.    6.Constipation    Problem List Items Addressed This Visit             ICD-10-CM       Coag and Thromboembolic    * (Principal) Pulmonary embolism without acute cor pulmonale, unspecified chronicity, unspecified pulmonary embolism type (Multi) - Primary I26.99    Relevant Orders    Vascular US Lower Extremity Venous Duplex Bilateral (Completed)     Other Visit Diagnoses         Codes    Epigastric pain     R10.13    Other specified soft tissue disorders     M79.89               Hospital Course/Progress note on the date of  discharge.      lOga Mendes  [id number]   Service: Internal Medicine / Hospitalist Date of service:9/4/2024                                  Full Code         Subjective        Review of Systems:   Review of system otherwise negative if not aforementioned above in subjective.  History Obtained From: Patient  Collateral History: Chart review     History Of Present Illness:  Olga Art is a 54 y.o. female with PMHx s/f large bowel obstruction s/p daphne procedure (pathology negative for malignancy), depression, bipolar 1 disorder, anemia presenting with abdominal pain. Pt had hx of daphne procedure, received end colostomy reversal, small bowel resection with anastomosis, lysis of adhesions and flexible sigmodioscopy on 8/13/2024 then hospitalized on 8/23/2024-8/28/2024 for sepsis with concern of post-op infection. She was found to be anemic and received blood transfusion. Negative CTAP findings and was discharged with 10 days of Keflex and Flagyl. She states her  abdominal pain started last night, is constant and diffuse throughout.  Has associated nausea and vomiting.  She has only finished a day of her antibiotics, couldn't keep food/meds down with emesis.  Does not feel like her prior bowel obstruction.  She notes her bowel movements are slowly returning to normal function after the procedure and states to be still having loose bowel movements. Denies fever chills, lightheadedness, dizziness, chest pain, shortness of breath, changes in urinary symptoms, leg swelling, syncope.     ED Course (Summary):   Vitals on presentation: 98.4F, 108 bpm, 20 RR, 187/127, 97% on RA  Labs: CMP largely unremarkable  BNP 30, lipase less than 3, troponin negative  Coagulation-INR/PT 1.1/12.9  CBC-WBC 7.4, hemoglobin 10.2, neutrophils 5.45, platelets 709  UA negative  Imaging: CXR-No consolidations, sizeable effusions or pneumothorax. Tortuous and possibly ectatic aorta.  CT AP-Postoperative changes related to previous bowel surgery as above. No bowel obstruction or free air. Questionable acute pulmonary embolic disease within visualized right lung base.  CTA for PE-Right-sided pulmonary emboli. No CT signs of right heart strain.   Interventions: Zofran, morphine 4 mg, LR 1 L, heparin bolus and drip, Rocephin, Flagyl, admission for further management        9/1...............Patient requesting a regular diet.  No reported: Hematuria, epistaxis, melena or hematochezia.Patient admits to abdominal discomfort after oral antibiotics.     9/2...............Patient endorsed constipation denies acute abdominal pain.  Disclose overall generalized malaise.  Patient has been reported patient is usually very up beat.     9/3...................No reported: Fevers, chills, palpitations, nausea or vomiting.   Patient still endorsed constipation.  She reports that she is supposed to start back on her Lamictal slowly at 25 mg dosage for 3 to 4 days then to 50 mg dosage 3 to 4 days and then back to 100 mg  dosage.     9/4......................No reported: Emesis, hemoptysis, epistaxis, melena, hematochezia, fevers, chills, chest pains or dyspnea at rest.     Objective        Physical Exam      Constitutional:       Appearance: Patient appeared in no acute cardiopulmonary distress.     Comments: Patient alert and oriented to person place time and situation.Patient appeared nontoxic.  HEENT:      Head: Normocephalic and atraumatic.Trachea midline      Nose:No observed congestion or rhinorrhea.     Mouth/Throat: Mucous membranes Moist, Trachea appeared  midline.  Eyes:      Extraocular Movements: Extraocular movements intact.      Pupils: Pupils are equal, round, and reactive to light.      Comments: No scleral icterus or conjunctival injection appreciated.   Cardiovascular:      Rate and Rhythm: Normal rate and regular rhythm. No clicks rubs or gallops, normal S1 and S2.No peripheral stigmata of endocarditis appreciated.     Pulmonary:      Lungs appeared clear to auscultation, no adventitious sound appreciated.  Abdominal:      General: Abdomen soft, nontender, active bowel sounds, no involuntary guarding or rebound tenderness appreciated.     Comments: None   Musculoskeletal:       Patient appeared to have full active range of motion for upper and lower extremities, no acute apparent joint deformity appreciated on examination.   No pitting edema or cyanosis appreciated.       Lymphadenopathy:      No appreciable palpable lymphadenopathy  Skin:     General: Skin is warm.      Coloration:  No jaundice     Findings: No abnormal appearing skin rashes or lesions that appeared acute noted on unclothed area of the skin..   Neurological:      General: No focal sensory or motor deficits appreciated, no meningeal signs or dysmetria noted.      Cranial Nerves: Cranial nerves II to XII appearing grossly intact.     Genitals:  Deferred  Psychiatric:         The patient appears to be displaying normal mood and affect at the time of  evaluation.     Labs:               Lab Results                   Lab Results   Component Value Date     GLUCOSE 110 (H) 09/03/2024     CALCIUM 8.0 (L) 09/03/2024      09/03/2024     K 4.0 09/03/2024     CO2 24 09/03/2024      09/03/2024     BUN 10 09/03/2024     CREATININE 0.76 09/03/2024              Lab Results   Component Value Date     WBC 5.4 09/04/2024     HGB 8.6 (L) 09/04/2024     HCT 28.0 (L) 09/04/2024     MCV 91 09/04/2024      (H) 09/04/2024                               Lab Results   Component Value Date     GLUCOSE 108 (H) 09/04/2024     CALCIUM 8.2 (L) 09/04/2024      09/04/2024     K 3.7 09/04/2024     CO2 29 09/04/2024      09/04/2024     BUN 7 09/04/2024     CREATININE 0.77 09/04/2024                                                      @[email or web address]@   [unfilled]   Susceptibility data from last 90 days.  Collected Specimen Info Organism Ampicillin Ciprofloxacin Levofloxacin Nitrofurantoin Penicillin Trimethoprim/Sulfamethoxazole Vancomycin   08/23/24 Urine from Clean Catch/Voided Enterococcus faecalis  S  I  S  S  S  R  S                   X-rays/ Images     [unfilled]   Radiology Results (last 21 days)     Procedure Component Value Units Date/Time   CT angio chest for pulmonary embolism [[id number]] Collected: 08/31/24 1315   Order Status: Completed Updated: 08/31/24 1317   Narrative:     Interpreted By:  Keegan Solo,  STUDY:  CT ANGIO CHEST FOR PULMONARY EMBOLISM;  8/31/2024 12:45 pm      INDICATION:  Signs/Symptoms:Possible PE seen on CT.      COMPARISON:  CT abdomen and pelvis from same day.      ACCESSION NUMBER(S):  [id number]      ORDERING CLINICIAN:  ERIC LEIVA      TECHNIQUE:  Helical data acquisition of the chest was obtained with IV contrast  material.  Images were reformatted in axial, coronal, and sagittal  planes.      FINDINGS:  Lungs and Pleura: Bibasilar and lingular atelectasis. No pulmonary  consolidation. No pleural effusion. No  pneumothorax.      Mediastinum and axilla: Right lower lobe and right upper lobe  pulmonary emboli extending is proximally to the lobar branch point.  No pulmonary artery enlargement or right ventricular enlargement to  indicate right heart strain. No adenopathy by CT size criteria. No  cardiomegaly or pericardial effusion. No thoracic aortic aneurysm.  Atherosclerosis. Advanced coronary artery calcifications.      Visualized Upper Abdomen: Refer to dedicated CT abdomen and pelvis  from same day for details.      MSK/Chest Wall: No aggressive bony lesion identified. Multilevel  degenerative changes in the spine.       Impression:     Right-sided pulmonary emboli. No CT signs of right heart strain.          Keegan Solo sent epic message to  ERIC LEIVA on 8/31/2024 at  1:08 pm.  (**-RCF-**) Findings:  See findings.              Signed by: Keegan Solo 8/31/2024 1:14 PM  Dictation workstation:   [id number]   CT abdomen pelvis w IV contrast [[id number]] Collected: 08/31/24 1147   Order Status: Completed Updated: 08/31/24 1147   Narrative:     Interpreted By:  Srikanth Andres,  STUDY:  CT ABDOMEN PELVIS W IV CONTRAST;  8/31/2024 11:11 am      INDICATION:  Signs/Symptoms:History of bowel obstruction status post colostomy  with reversal complicated by recent infection, still on antibiotics.  1 day of diffuse abdominal pain, lack of bowel movement, and  nausea/vomiting..      COMPARISON:  None.      ACCESSION NUMBER(S):  [id number]      ORDERING CLINICIAN:  ERIC LEIVA      TECHNIQUE:  CT of the abdomen and pelvis was performed.  Standard contiguous  axial images were obtained at 3 mm slice thickness through the  abdomen and pelvis. Coronal and sagittal reconstructions at 3 mm  slice thickness were performed.      75  ml of contrast Omnipaque 350 were administered intravenously  without immediate complication.      FINDINGS:  LOWER CHEST:  Questionable incidental filling defect within the right lower  lobe  pulmonary artery branches concerning for acute aortic pulmonary  embolic disease. Evaluation is limited. Minimal lingular atelectasis,  scarring or mild infiltrate, incompletely included in field of view.  The remainder of the visualized lung bases otherwise clear.      ABDOMEN:      LIVER:  Liver is normal in size. No focal lesions are seen.      BILE DUCTS:  Biliary system is nondilated.      GALLBLADDER:  The gallbladder has been surgically removed.      PANCREAS:  No focal lesions. No peripancreatic fat stranding.      SPLEEN:  The spleen is normal in size. No focal abnormalities are seen.      ADRENAL GLANDS:  The adrenal glands bilaterally within normal limits.      KIDNEYS AND URETERS:  No hydronephrosis or renal masses. No perinephric stranding. No  radiodense renal calculi.      PELVIS:      BLADDER:  Within normal limits as distended. No bladder calculi or masses.      REPRODUCTIVE ORGANS:  The uterus is not seen and may have been surgically removed. No  pelvic free fluid, masses or lymphadenopathy      BOWEL:  Surgical anastomotic sutures in the rectosigmoid and central lower  abdominal small bowel. Small and large bowel are nondilated. Surgical  scars within the midline ventral abdominal wall and to the left of  midline related to previous colostomy site. The appendix is not  positively identified, however, no findings to suggest acute  appendicitis is seen. No mesenteric edema or lymphadenopathy. No  fluid collections.      VESSELS:  Mild wall calcification of the aorta. No aneurysm or dissection.      PERITONEUM/RETROPERITONEUM/LYMPH NODES:  No retroperitoneal masses are seen.  No lymphadenopathy.      BONES AND ABDOMINAL WALL:  There is no evidence for destructive lytic or blastic bone lesions  identified.  Mild multilevel discogenic degenerative changes.       Impression:     1.  Postoperative changes related to previous bowel surgery as above.  No bowel obstruction or free air.  2.  Questionable acute pulmonary embolic disease within visualized  right lung base. Study not optimized for evaluation of pulmonary  artery branches. Clinical correlation recommended. Further evaluation  with chest CT can be performed for better assessment as clinically  warranted.  3. Additional detailed findings as above.      SUPPLEMENTAL INFORMATION:  Notifi message was left for ERIC LEIVA regarding this exam by Dr. Andres on 8/31/2024 at approximately 11:43 hours. Critical Finding:  See findings. Notification was initiated on 8/31/2024 at 11:43 am by  Srikanth Andres.  (**-YCF-**) Instructions:      Signed by: Srikanth Andres 8/31/2024 11:46 AM  Dictation workstation:   [id number]   XR chest 1 view [[id number]] Collected: 08/31/24 1113   Order Status: Completed Updated: 08/31/24 1113   Narrative:     Interpreted By:  Srikanth Andres,  STUDY:  XR CHEST 1 VIEW;; 8/31/2024 10:40 am      INDICATION:  Signs/Symptoms:abdominal pain.      COMPARISON:  CT abdomen from 08/23/2024      ACCESSION NUMBER(S):  [id number]      ORDERING CLINICIAN:  ERIC LEIVA      FINDINGS:  Tortuous and possibly ectatic aorta. Evaluation limited to portable  technique and positioning. Dissection can not be excluded in the  basis of this exam. Left basilar atelectasis or scarring. No  consolidations, sizeable effusions or pneumothorax. Degenerative  changes of bilateral AC joints.       Impression:     1. No consolidations, sizeable effusions or pneumothorax.      2. Tortuous and possibly ectatic aorta. Acute aortic pathology not  excluded in the basis of this exam only. If such clinical concern  persists, further evaluation with chest CT should be considered for  better assessment.              Signed by: Srikanth Andres 8/31/2024 11:12 AM  Dictation workstation:   [id number]                  Medical Problems         Problem List         * (Principal) Pulmonary embolism without acute cor pulmonale, unspecified chronicity, unspecified pulmonary  embolism type (Multi)     Bipolar 1 disorder (Multi)     Complete intestinal obstruction (Multi)     Depressive disorder     History of colostomy reversal     Obstruction of colon (Multi)     Peptic ulcer with hemorrhage and perforation and with obstruction (Multi)     Severe episode of recurrent major depressive disorder (Multi)     Overview Signed 8/31/2024  2:14 PM by Cely Broussard PA-C       ICD-10 utility update                        Above medical problems may be reflective of historical medical problems that may have resolved and may not related to acute clinical condition/medical problems.     Clinical impression/plan:        54 y.o. female with PMHx s/f large bowel obstruction s/p daphne procedure (pathology negative for malignancy), depression, anemia presenting with abdominal pain.      Pulmonary embolism without acute cor pulmonale  -presumed provoked 2/2 to recent abdominal surgery and periods of immobilization  -Vascular US LE bilateral pending  -Received heparin bolus, continue on heparin drip     Abdominal pain  -CT AP-Postoperative changes related to previous bowel surgery as above. No bowel obstruction or free air.   -no fever, normal WBC  -Continue on p.o. Keflex and Flagyl     Anemia  -Hgb 10.2, stable and platelets improving at 709 compared to 523 from last hospitalization on 8/22/2024  -Continue to trend while admitted and transfuse as necessary     Depression  -Continue on home venlafaxine     Bipolar 1 disorder  -Continue on lamotrigine     Diet: Cardiac  DVT Prophylaxis: Heparin  Code Status: Full code        Disposition/additional care plan/interventions: 9/1/2024     Continue heparin drip     Monitor clinical signs of bleeding     Monitor electrolytes     Nutritionist consult     Discussion at bedside later today with patient  concerning care plan, patient's  in agreement with care plan.     Suspect provoked VTE.  Formal consult to hematology oncology concerning additional  workup.  If okay with hematology oncology anticipate transition to oral anticoagulation/NOAC in the next 24 hours.     Await ultrasound of lower extremities bilaterally     Clinical suspicion for likely iatrogenic related abdominal discomfort related to oral Flagyl, transition to IV therapy and monitor clinical response.     Monitor H&H/CBC.            Disposition/additional care plan/interventions: 9/2/2024     Non acute appearing abdominal examination, no rebound tenderness appreciated.  Constipation reported, active bowel sounds, no involuntary guarding appreciated.     CT scan of abdomen on presentation :1.  Postoperative changes related to previous bowel surgery  no bowel obstruction or free air.      Continue monitoring, continue stool softeners,     Continue heparin drip today     Patient reports marked nausea after switching to IV Flagyl.,  Reported penicillin allergy, continue antibiotic therapy.     Continue heparin drip likely transition to oral therapy next 24 hours of.  Patient and  felt that overall patient was discharged too early from her prior hospitalization before presentation here..     Monitor H&H     Incentive spirometer     Physical therapy     Await lower extremity ultrasounds results  for VTE         Disposition/additional care plan/interventions: 9/3/2024        Transition to Eliquis therapy     Appreciate recommendation and input from hematology.     Lack of IV access, known penicillin allergy, trial of continuing anaerobic coverage with oral Flagyl     Abdominal examination still appeared benign, nonacute abdomen noted, continue laxative therapy.     Monitor tolerance to Flagyl, take with food or a possible antiemetic before therapy.        Disposition/additional care plan/interventions: 9/4/2024     Patient requested discharge home today.     Patient admits to bowel movements post therapy with mag citrate, MiraLAX and Colace.     No clinical signs of bleeding reported.     Patient  counseled concerning following protocol followed by her psychiatrist for Lamictal titration to her chronic baseline dosage.     Patient should seek medical attention immediately if condition should worsen, new symptoms develop , no further improvement or recurrence of presenting symptomatology, patient warned.     Care plan also discussed with patient's  was in agreement with plan of care.              Dictation performed with assistance of voice recognition device therefore transcription errors are possible.    Consultants    Hematology/oncology         Surgeries/Procedures:    None               Your medication list        ASK your doctor about these medications        Instructions Last Dose Given Next Dose Due   cephalexin 500 mg capsule  Commonly known as: Keflex           lamoTRIgine 100 mg tablet  Commonly known as: LaMICtal           metroNIDAZOLE 500 mg tablet  Commonly known as: Flagyl           mirtazapine 15 mg tablet  Commonly known as: Remeron           venlafaxine 150 mg 24 hr tablet           venlafaxine XR 37.5 mg 24 hr capsule  Commonly known as: Effexor-XR                       Disposition;    Patient requested discharge home today.  Patient in agreement with discharge.  Patient should seek medical attention immediately if condition should worsen, new symptoms develop , no further improvement or recurrence of presenting symptomatology, patient warned.    Continue home antibiotic therapy Zofran added as needed.  Patient appears to be tolerating her oral antibiotic therapy fairly well today.    Follow-up:    Follow -up with family physician within one week. Outpatient follow-up with hematology and general surgeon recommended      Discharge Time : 36 mins      Patient should seek medical attention immediately if condition should worsen , presenting symptoms/conditions do not resolve or new symptoms should developed,patient warned.     Dictation performed with assistance of voice recognition  device therefore transcription errors are possible.

## 2024-09-04 NOTE — DISCHARGE INSTRUCTIONS
Please make  an appointment with your general physician for follow-up     Patient should seek medical attention immediately if condition should worsen, new symptoms develop , no further improvement or recurrence of presenting symptomatology, patient warned.    Continue ostomy site dressing changes as directed by wound care nurse ,with supplies provided.

## 2024-09-04 NOTE — PROGRESS NOTES
Olga Mendes  [id number]   Service: Internal Medicine / Hospitalist Date of service:9/4/2024                                  Full Code         Subjective        Review of Systems:   Review of system otherwise negative if not aforementioned above in subjective.  History Obtained From: Patient  Collateral History: Chart review     History Of Present Illness:  Olga Art is a 54 y.o. female with PMHx s/f large bowel obstruction s/p daphne procedure (pathology negative for malignancy), depression, bipolar 1 disorder, anemia presenting with abdominal pain. Pt had hx of daphne procedure, received end colostomy reversal, small bowel resection with anastomosis, lysis of adhesions and flexible sigmodioscopy on 8/13/2024 then hospitalized on 8/23/2024-8/28/2024 for sepsis with concern of post-op infection. She was found to be anemic and received blood transfusion. Negative CTAP findings and was discharged with 10 days of Keflex and Flagyl. She states her abdominal pain started last night, is constant and diffuse throughout.  Has associated nausea and vomiting.  She has only finished a day of her antibiotics, couldn't keep food/meds down with emesis.  Does not feel like her prior bowel obstruction.  She notes her bowel movements are slowly returning to normal function after the procedure and states to be still having loose bowel movements. Denies fever chills, lightheadedness, dizziness, chest pain, shortness of breath, changes in urinary symptoms, leg swelling, syncope.     ED Course (Summary):   Vitals on presentation: 98.4F, 108 bpm, 20 RR, 187/127, 97% on RA  Labs: CMP largely unremarkable  BNP 30, lipase less than 3, troponin negative  Coagulation-INR/PT 1.1/12.9  CBC-WBC 7.4, hemoglobin 10.2, neutrophils 5.45, platelets 709  UA negative  Imaging: CXR-No consolidations, sizeable effusions or pneumothorax. Tortuous and possibly ectatic aorta.  CT AP-Postoperative changes related to previous bowel surgery as  above. No bowel obstruction or free air. Questionable acute pulmonary embolic disease within visualized right lung base.  CTA for PE-Right-sided pulmonary emboli. No CT signs of right heart strain.   Interventions: Zofran, morphine 4 mg, LR 1 L, heparin bolus and drip, Rocephin, Flagyl, admission for further management        9/1...............Patient requesting a regular diet.  No reported: Hematuria, epistaxis, melena or hematochezia.Patient admits to abdominal discomfort after oral antibiotics.     9/2...............Patient endorsed constipation denies acute abdominal pain.  Disclose overall generalized malaise.  Patient has been reported patient is usually very up beat.     9/3...................No reported: Fevers, chills, palpitations, nausea or vomiting.   Patient still endorsed constipation.  She reports that she is supposed to start back on her Lamictal slowly at 25 mg dosage for 3 to 4 days then to 50 mg dosage 3 to 4 days and then back to 100 mg dosage.    9/4......................No reported: Emesis, hemoptysis, epistaxis, melena, hematochezia, fevers, chills, chest pains or dyspnea at rest.     Objective        Physical Exam      Constitutional:       Appearance: Patient appeared in no acute cardiopulmonary distress.     Comments: Patient alert and oriented to person place time and situation.Patient appeared nontoxic.  HEENT:      Head: Normocephalic and atraumatic.Trachea midline      Nose:No observed congestion or rhinorrhea.     Mouth/Throat: Mucous membranes Moist, Trachea appeared  midline.  Eyes:      Extraocular Movements: Extraocular movements intact.      Pupils: Pupils are equal, round, and reactive to light.      Comments: No scleral icterus or conjunctival injection appreciated.   Cardiovascular:      Rate and Rhythm: Normal rate and regular rhythm. No clicks rubs or gallops, normal S1 and S2.No peripheral stigmata of endocarditis appreciated.     Pulmonary:      Lungs appeared clear to  auscultation, no adventitious sound appreciated.  Abdominal:      General: Abdomen soft, nontender, active bowel sounds, no involuntary guarding or rebound tenderness appreciated.     Comments: None   Musculoskeletal:       Patient appeared to have full active range of motion for upper and lower extremities, no acute apparent joint deformity appreciated on examination.   No pitting edema or cyanosis appreciated.       Lymphadenopathy:      No appreciable palpable lymphadenopathy  Skin:     General: Skin is warm.      Coloration:  No jaundice     Findings: No abnormal appearing skin rashes or lesions that appeared acute noted on unclothed area of the skin..   Neurological:      General: No focal sensory or motor deficits appreciated, no meningeal signs or dysmetria noted.      Cranial Nerves: Cranial nerves II to XII appearing grossly intact.     Genitals:  Deferred  Psychiatric:         The patient appears to be displaying normal mood and affect at the time of evaluation.     Labs:               Lab Results              Lab Results   Component Value Date     GLUCOSE 110 (H) 09/03/2024     CALCIUM 8.0 (L) 09/03/2024      09/03/2024     K 4.0 09/03/2024     CO2 24 09/03/2024      09/03/2024     BUN 10 09/03/2024     CREATININE 0.76 09/03/2024       Lab Results   Component Value Date    WBC 5.4 09/04/2024    HGB 8.6 (L) 09/04/2024    HCT 28.0 (L) 09/04/2024    MCV 91 09/04/2024     (H) 09/04/2024                  Lab Results   Component Value Date    GLUCOSE 108 (H) 09/04/2024    CALCIUM 8.2 (L) 09/04/2024     09/04/2024    K 3.7 09/04/2024    CO2 29 09/04/2024     09/04/2024    BUN 7 09/04/2024    CREATININE 0.77 09/04/2024                                    @[email or web address]@   [unfilled]   Susceptibility data from last 90 days.  Collected Specimen Info Organism Ampicillin Ciprofloxacin Levofloxacin Nitrofurantoin Penicillin Trimethoprim/Sulfamethoxazole Vancomycin   08/23/24 Urine  from Clean Catch/Voided Enterococcus faecalis  S  I  S  S  S  R  S                   X-rays/ Images     [unfilled]   Radiology Results (last 21 days)     Procedure Component Value Units Date/Time   CT angio chest for pulmonary embolism [[id number]] Collected: 08/31/24 1315   Order Status: Completed Updated: 08/31/24 1317   Narrative:     Interpreted By:  Keegan Solo,  STUDY:  CT ANGIO CHEST FOR PULMONARY EMBOLISM;  8/31/2024 12:45 pm      INDICATION:  Signs/Symptoms:Possible PE seen on CT.      COMPARISON:  CT abdomen and pelvis from same day.      ACCESSION NUMBER(S):  [id number]      ORDERING CLINICIAN:  ERIC LEIVA      TECHNIQUE:  Helical data acquisition of the chest was obtained with IV contrast  material.  Images were reformatted in axial, coronal, and sagittal  planes.      FINDINGS:  Lungs and Pleura: Bibasilar and lingular atelectasis. No pulmonary  consolidation. No pleural effusion. No pneumothorax.      Mediastinum and axilla: Right lower lobe and right upper lobe  pulmonary emboli extending is proximally to the lobar branch point.  No pulmonary artery enlargement or right ventricular enlargement to  indicate right heart strain. No adenopathy by CT size criteria. No  cardiomegaly or pericardial effusion. No thoracic aortic aneurysm.  Atherosclerosis. Advanced coronary artery calcifications.      Visualized Upper Abdomen: Refer to dedicated CT abdomen and pelvis  from same day for details.      MSK/Chest Wall: No aggressive bony lesion identified. Multilevel  degenerative changes in the spine.       Impression:     Right-sided pulmonary emboli. No CT signs of right heart strain.          Keegan Solo sent epic message to  ERIC LEIVA on 8/31/2024 at  1:08 pm.  (**-RCF-**) Findings:  See findings.              Signed by: Keegan Solo 8/31/2024 1:14 PM  Dictation workstation:   [id number]   CT abdomen pelvis w IV contrast [[id number]] Collected: 08/31/24 1147   Order Status: Completed  Updated: 08/31/24 1147   Narrative:     Interpreted By:  Srikanth Andres,  STUDY:  CT ABDOMEN PELVIS W IV CONTRAST;  8/31/2024 11:11 am      INDICATION:  Signs/Symptoms:History of bowel obstruction status post colostomy  with reversal complicated by recent infection, still on antibiotics.  1 day of diffuse abdominal pain, lack of bowel movement, and  nausea/vomiting..      COMPARISON:  None.      ACCESSION NUMBER(S):  [id number]      ORDERING CLINICIAN:  ERIC LEIVA      TECHNIQUE:  CT of the abdomen and pelvis was performed.  Standard contiguous  axial images were obtained at 3 mm slice thickness through the  abdomen and pelvis. Coronal and sagittal reconstructions at 3 mm  slice thickness were performed.      75  ml of contrast Omnipaque 350 were administered intravenously  without immediate complication.      FINDINGS:  LOWER CHEST:  Questionable incidental filling defect within the right lower lobe  pulmonary artery branches concerning for acute aortic pulmonary  embolic disease. Evaluation is limited. Minimal lingular atelectasis,  scarring or mild infiltrate, incompletely included in field of view.  The remainder of the visualized lung bases otherwise clear.      ABDOMEN:      LIVER:  Liver is normal in size. No focal lesions are seen.      BILE DUCTS:  Biliary system is nondilated.      GALLBLADDER:  The gallbladder has been surgically removed.      PANCREAS:  No focal lesions. No peripancreatic fat stranding.      SPLEEN:  The spleen is normal in size. No focal abnormalities are seen.      ADRENAL GLANDS:  The adrenal glands bilaterally within normal limits.      KIDNEYS AND URETERS:  No hydronephrosis or renal masses. No perinephric stranding. No  radiodense renal calculi.      PELVIS:      BLADDER:  Within normal limits as distended. No bladder calculi or masses.      REPRODUCTIVE ORGANS:  The uterus is not seen and may have been surgically removed. No  pelvic free fluid, masses or lymphadenopathy       BOWEL:  Surgical anastomotic sutures in the rectosigmoid and central lower  abdominal small bowel. Small and large bowel are nondilated. Surgical  scars within the midline ventral abdominal wall and to the left of  midline related to previous colostomy site. The appendix is not  positively identified, however, no findings to suggest acute  appendicitis is seen. No mesenteric edema or lymphadenopathy. No  fluid collections.      VESSELS:  Mild wall calcification of the aorta. No aneurysm or dissection.      PERITONEUM/RETROPERITONEUM/LYMPH NODES:  No retroperitoneal masses are seen.  No lymphadenopathy.      BONES AND ABDOMINAL WALL:  There is no evidence for destructive lytic or blastic bone lesions  identified.  Mild multilevel discogenic degenerative changes.       Impression:     1.  Postoperative changes related to previous bowel surgery as above.  No bowel obstruction or free air.  2. Questionable acute pulmonary embolic disease within visualized  right lung base. Study not optimized for evaluation of pulmonary  artery branches. Clinical correlation recommended. Further evaluation  with chest CT can be performed for better assessment as clinically  warranted.  3. Additional detailed findings as above.      SUPPLEMENTAL INFORMATION:  Notifi message was left for ERIC LEIVA regarding this exam by Dr. Andres on 8/31/2024 at approximately 11:43 hours. Critical Finding:  See findings. Notification was initiated on 8/31/2024 at 11:43 am by  Srikanth Andres.  (**-YCF-**) Instructions:      Signed by: Srikanth Andres 8/31/2024 11:46 AM  Dictation workstation:   [id number]   XR chest 1 view [[id number]] Collected: 08/31/24 1113   Order Status: Completed Updated: 08/31/24 1113   Narrative:     Interpreted By:  Srikanth Andres,  STUDY:  XR CHEST 1 VIEW;; 8/31/2024 10:40 am      INDICATION:  Signs/Symptoms:abdominal pain.      COMPARISON:  CT abdomen from 08/23/2024      ACCESSION NUMBER(S):  [id number]      ORDERING  CLINICIAN:  ERIC LEIVA      FINDINGS:  Tortuous and possibly ectatic aorta. Evaluation limited to portable  technique and positioning. Dissection can not be excluded in the  basis of this exam. Left basilar atelectasis or scarring. No  consolidations, sizeable effusions or pneumothorax. Degenerative  changes of bilateral AC joints.       Impression:     1. No consolidations, sizeable effusions or pneumothorax.      2. Tortuous and possibly ectatic aorta. Acute aortic pathology not  excluded in the basis of this exam only. If such clinical concern  persists, further evaluation with chest CT should be considered for  better assessment.              Signed by: Srikanth Andres 8/31/2024 11:12 AM  Dictation workstation:   [id number]                  Medical Problems         Problem List         * (Principal) Pulmonary embolism without acute cor pulmonale, unspecified chronicity, unspecified pulmonary embolism type (Multi)     Bipolar 1 disorder (Multi)     Complete intestinal obstruction (Multi)     Depressive disorder     History of colostomy reversal     Obstruction of colon (Multi)     Peptic ulcer with hemorrhage and perforation and with obstruction (Multi)     Severe episode of recurrent major depressive disorder (Multi)     Overview Signed 8/31/2024  2:14 PM by Cely Broussard PA-C       ICD-10 utility update                        Above medical problems may be reflective of historical medical problems that may have resolved and may not related to acute clinical condition/medical problems.     Clinical impression/plan:        54 y.o. female with PMHx s/f large bowel obstruction s/p daphne procedure (pathology negative for malignancy), depression, anemia presenting with abdominal pain.      Pulmonary embolism without acute cor pulmonale  -presumed provoked 2/2 to recent abdominal surgery and periods of immobilization  -Vascular US LE bilateral pending  -Received heparin bolus, continue on heparin drip     Abdominal  pain  -CT AP-Postoperative changes related to previous bowel surgery as above. No bowel obstruction or free air.   -no fever, normal WBC  -Continue on p.o. Keflex and Flagyl     Anemia  -Hgb 10.2, stable and platelets improving at 709 compared to 523 from last hospitalization on 8/22/2024  -Continue to trend while admitted and transfuse as necessary     Depression  -Continue on home venlafaxine     Bipolar 1 disorder  -Continue on lamotrigine     Diet: Cardiac  DVT Prophylaxis: Heparin  Code Status: Full code        Disposition/additional care plan/interventions: 9/1/2024     Continue heparin drip     Monitor clinical signs of bleeding     Monitor electrolytes     Nutritionist consult     Discussion at bedside later today with patient  concerning care plan, patient's  in agreement with care plan.     Suspect provoked VTE.  Formal consult to hematology oncology concerning additional workup.  If okay with hematology oncology anticipate transition to oral anticoagulation/NOAC in the next 24 hours.     Await ultrasound of lower extremities bilaterally     Clinical suspicion for likely iatrogenic related abdominal discomfort related to oral Flagyl, transition to IV therapy and monitor clinical response.     Monitor H&H/CBC.            Disposition/additional care plan/interventions: 9/2/2024     Non acute appearing abdominal examination, no rebound tenderness appreciated.  Constipation reported, active bowel sounds, no involuntary guarding appreciated.     CT scan of abdomen on presentation :1.  Postoperative changes related to previous bowel surgery  no bowel obstruction or free air.      Continue monitoring, continue stool softeners,     Continue heparin drip today     Patient reports marked nausea after switching to IV Flagyl.,  Reported penicillin allergy, continue antibiotic therapy.     Continue heparin drip likely transition to oral therapy next 24 hours of.  Patient and  felt that overall  patient was discharged too early from her prior hospitalization before presentation here..     Monitor H&H     Incentive spirometer     Physical therapy     Await lower extremity ultrasounds results  for VTE         Disposition/additional care plan/interventions: 9/3/2024        Transition to Eliquis therapy     Appreciate recommendation and input from hematology.     Lack of IV access, known penicillin allergy, trial of continuing anaerobic coverage with oral Flagyl     Abdominal examination still appeared benign, nonacute abdomen noted, continue laxative therapy.     Monitor tolerance to Flagyl, take with food or a possible antiemetic before therapy.      Disposition/additional care plan/interventions: 9/4/2024     Patient requested discharge home today.    Patient admits to bowel movements post therapy with mag citrate, MiraLAX and Colace.    No clinical signs of bleeding reported.    Patient counseled concerning following protocol followed by her psychiatrist for Lamictal titration to her chronic baseline dosage.    Patient should seek medical attention immediately if condition should worsen, new symptoms develop , no further improvement or recurrence of presenting symptomatology, patient warned.    Care plan also discussed with patient's  was in agreement with plan of care.              Dictation performed with assistance of voice recognition device therefore transcription errors are possible.

## 2024-09-04 NOTE — CARE PLAN
The patient's goals for the shift include      The clinical goals for the shift include safety throghout shift and pain 3 ro less.    Over the shift, the patient did not make progress toward the following goals. Barriers to progression include - none noted.  Recommendations to address these barriers include - none noted yet ensure ongoing education related to impending discharge.

## 2024-09-05 LAB
DRVVT SCREEN TO CONFIRM RATIO: 0.97 RATIO
DRVVT/DRVVT CFM NRMLZD PPP-RTO: 1.33 RATIO
DRVVT/DRVVT CFM P DOAC NEUT NORM PPP-RTO: 0.73 RATIO
LA 2 SCREEN W REFLEX-IMP: NORMAL
NORMALIZED SCT PPP-RTO: 0.82 RATIO
SILICA CLOTTING TIME CONFIRMATION: 1.26 RATIO
SILICA CLOTTING TIME SCREEN: 1.03 RATIO

## 2024-09-06 LAB — PROT C AG ACT/NOR PPP IA: >95 % (ref 63–153)

## 2024-09-10 LAB
PROT C ACT/NOR PPP CHRO: 132 % ACTIVITY (ref 70–150)
PROT S ACT/NOR PPP: 68 % ACTIVITY (ref 64–150)
PROT S FREE AG ACT/NOR PPP IA: 39 % (ref 55–124)

## 2024-09-16 LAB
ELECTRONICALLY SIGNED BY: NORMAL
ELECTRONICALLY SIGNED BY: NORMAL
FACTOR II-PROTHROMBIN INTERPRETATION: NORMAL
FACTOR II-PROTHROMBIN RESULT: NORMAL
FACTOR V LEIDEN INTERPRETATION: NORMAL
FACTOR V LEIDEN RESULT: NORMAL